# Patient Record
Sex: MALE | Race: WHITE | NOT HISPANIC OR LATINO | URBAN - METROPOLITAN AREA
[De-identification: names, ages, dates, MRNs, and addresses within clinical notes are randomized per-mention and may not be internally consistent; named-entity substitution may affect disease eponyms.]

---

## 2017-01-20 ENCOUNTER — OUTPATIENT (OUTPATIENT)
Dept: OUTPATIENT SERVICES | Facility: HOSPITAL | Age: 72
LOS: 1 days | End: 2017-01-20

## 2017-04-21 ENCOUNTER — OUTPATIENT (OUTPATIENT)
Dept: OUTPATIENT SERVICES | Facility: HOSPITAL | Age: 72
LOS: 1 days | End: 2017-04-21

## 2017-10-25 ENCOUNTER — OUTPATIENT (OUTPATIENT)
Dept: OUTPATIENT SERVICES | Facility: HOSPITAL | Age: 72
LOS: 1 days | End: 2017-10-25

## 2018-03-02 ENCOUNTER — OUTPATIENT (OUTPATIENT)
Dept: OUTPATIENT SERVICES | Facility: HOSPITAL | Age: 73
LOS: 1 days | End: 2018-03-02

## 2018-09-07 ENCOUNTER — OUTPATIENT (OUTPATIENT)
Dept: OUTPATIENT SERVICES | Facility: HOSPITAL | Age: 73
LOS: 1 days | End: 2018-09-07

## 2019-02-04 ENCOUNTER — APPOINTMENT (OUTPATIENT)
Dept: ORTHOPEDIC SURGERY | Facility: CLINIC | Age: 74
End: 2019-02-04
Payer: MEDICARE

## 2019-02-04 VITALS
BODY MASS INDEX: 31.1 KG/M2 | HEIGHT: 69 IN | HEART RATE: 52 BPM | SYSTOLIC BLOOD PRESSURE: 172 MMHG | WEIGHT: 210 LBS | DIASTOLIC BLOOD PRESSURE: 85 MMHG

## 2019-02-04 DIAGNOSIS — M19.019 PRIMARY OSTEOARTHRITIS, UNSPECIFIED SHOULDER: ICD-10-CM

## 2019-02-04 DIAGNOSIS — Z80.0 FAMILY HISTORY OF MALIGNANT NEOPLASM OF DIGESTIVE ORGANS: ICD-10-CM

## 2019-02-04 DIAGNOSIS — M16.11 UNILATERAL PRIMARY OSTEOARTHRITIS, RIGHT HIP: ICD-10-CM

## 2019-02-04 DIAGNOSIS — Z78.9 OTHER SPECIFIED HEALTH STATUS: ICD-10-CM

## 2019-02-04 DIAGNOSIS — M16.10 UNILATERAL PRIMARY OSTEOARTHRITIS, UNSPECIFIED HIP: ICD-10-CM

## 2019-02-04 DIAGNOSIS — M17.10 UNILATERAL PRIMARY OSTEOARTHRITIS, UNSPECIFIED KNEE: ICD-10-CM

## 2019-02-04 PROCEDURE — 73502 X-RAY EXAM HIP UNI 2-3 VIEWS: CPT | Mod: RT

## 2019-02-04 PROCEDURE — 99214 OFFICE O/P EST MOD 30 MIN: CPT

## 2019-02-04 RX ORDER — CHLORHEXIDINE GLUCONATE 4 %
1000 LIQUID (ML) TOPICAL
Refills: 0 | Status: ACTIVE | COMMUNITY

## 2019-02-04 NOTE — HISTORY OF PRESENT ILLNESS
[Pain Location] : pain [Worsening] : worsening [___ mths] : [unfilled] month(s) ago [6] : an average pain level of 6/10 [3] : a minimum pain level of 3/10 [8] : a maximum pain level of 8/10 [de-identified] : 73 year old male presents with right hip pain, which started on 6/1/18. The pain is an ache. The pain fluctuates from a 3-8/10.  Rest and Naproxen alleviates his pain. He exercises 3 times a week with aerobic resistance and core and flexibility exercises. The patient takes Tylenol and Naproxen 325 mg as needed. He notes the pain is limiting his activities. Walking and bending aggravates the pain. He has had no prior treatment of the right hip.\par The patient had a left hip replacement with Dr. Rawls in 2011 at [facility].\par He had a right knee replacement in 2010 at [facility].

## 2019-02-04 NOTE — PHYSICAL EXAM
[ALL] : dorsalis pedis, posterior tibial, femoral, popliteal, and radial 2+ and symmetric bilaterally [Normal] : Gait: normal [LE] : Sensory: Intact in bilateral lower extremities [Poor Appearance] : well-appearing [Acute Distress] : not in acute distress [Obese] : not obese [de-identified] : GENERAL APPEARANCE: Well nourished and hydrated, pleasant, alert, and oriented x 3. Appears their stated age. \par HEENT: Normocephalic, extraocular eye motion intact. Nasal septum midline. Oral cavity clear. External auditory canal clear. \par RESPIRATORY: Breath sounds clear and audible in all lobes. No wheezing, No accessory muscle use.\par CARDIOVASCULAR: No apparent abnormalities. No lower leg edema. No varicosities. Pedal pulses are palpable.\par NEUROLOGIC: Sensation is normal, no muscle weakness in the upper or lower extremities.\par DERMATOLOGIC: No apparent skin lesions, moist, warm, no rash.\par SPINE: Cervical spine appears normal and moves freely; thoracic spine appears normal and moves freely; lumbosacral spine appears normal and moves freely, normal, nontender.\par MUSCULOSKELETAL: Hands, wrists, and elbows are normal and move freely, shoulders are normal and move freely. [de-identified] : Right hip examination demonstrates positive Stinchfield, crepitus with ROM [de-identified] : 3V x-ray of the right hip and pelvis done in office today and reviewed by Dr. Alex Herring demonstrates severe bone on bone right hip osteoarthritis.

## 2019-02-04 NOTE — DISCUSSION/SUMMARY
[de-identified] : 73 year old male presents with severe bone on bone right hip osteoarthritis. We discussed the nature of the condition and the treatment options. Based on the x-ray and physical exam, the patient is a candidate for a right total hip replacement, which the patient will consider scheduling. The patient will follow up after the procedure should he decide to proceed with the surgery.\par \par Total hip arthroplasty: A hip replacement means a resurfacing of both surfaces of the hip, with metal, ceramic and/or plastic parts. The prosthetic parts are usually press fit into bone, and only rarely cemented into position. Patients are allowed to weight bear as tolerated in most cases. The postoperative motion is determined by multiple factors the most important of which is preoperative motion. In general, the better the motion pre operatively, the better the motion post operatively. The operation, pending medical clearance, generally requires a hospitalization of 3-4 days. In general, we prefer to perform the procedure under epidural or general anesthesia. Preoperatively we institute a nomogram for blood management which may include the administration of procrit. The operative procedure takes approximately 1-2 hours. The operation requires an oblique incision anywhere from 4-6 inches over the posterolateral hip region. Post operatively the patient is walking the day of or the day after surgery. The first couple of days are very painful and the pain medication will alleviate but not eliminate the pain. Thus the patient must really push hard to get their mobility back. Our goal for having the person go home is that they can walk with a walker or a cane. The walking aide is to be dispensed with once the patient is secure enough. In general, there is no cast or braces required with a routine hip replacement. In the long term, we do not encourage our patients to run for the sake of running; although, pending their pre operative status, we often allow patients to play doubles tennis or comparable activities. We also allow them to do gentle intermediate downhill skiing if they are truly an expert skier. Biking is encouraged as well as swimming. The follow up periods are usually at 3 weeks, 6 weeks, 3 months, and yearly intervals. Potential complications with total hip replacements include anaesthetic complications and death, infection (less than 1%), nerve damage, and in the case of a sciatic nerve injury, a permanent foot drop, (a flapping foot with ambulation that requires bracing). There are always areas of skin numbness but this is not an untoward effect nor do we consider it a complication. Other potential complications include dislocation of the hip component (less than 5%). In cases of recurrent dislocation revision surgery may be required. The loosening of either the acetabular or femoral component is much more infrequent. The components may wear, creating particulate debris, loosening and systemic complications. Specific concerns exist with all bearing surfaces such as metal sensitivity with metal on metal components, and the risk of fracture and squeaking with ceramic components. Major blood vessel damage is also extremely rare. Theoretically because of the anatomic proximity of the femoral artery, it could be lacerated with subsequent repairs required. Albeit unlikely, a disruption of the femoral artery could theoretically result in an amputation. Similarly, infection could theoretically result in an amputation if one were to grow out an organism that cannot be controlled with antibiotics. Most infections require removal of the prosthesis, placement of an antibiotic spacer, IV antibiotics for eradication, prior to reimplantation. General medical complications include phlebitis for which we prophylactically anticoagulate but it could still occur and fatal pulmonary embolus has been reported. Cardiovascular problems, such as a heart attack or ischemia are always a concern with such hemodynamic changes in the blood vascular system. There is a risk of leg length discrepancy and the need for a shoe lift. Other general complications are very rare but anything in medicine could theoretically happen. I think the patient understands the risk benefit ratio of total hip replacement and will think about whether they would like to pursue an operative or non-operative option.

## 2019-02-04 NOTE — REVIEW OF SYSTEMS
[Joint Pain] : joint pain [Negative] : Heme/Lymph [Joint Stiffness] : joint stiffness [FreeTextEntry9] : right hip

## 2019-02-04 NOTE — ADDENDUM
[FreeTextEntry1] : I, Smita Weiss, acted solely as a scribe for Dr. Alex Herring on this date 02/04/2019.

## 2019-03-15 ENCOUNTER — OUTPATIENT (OUTPATIENT)
Dept: OUTPATIENT SERVICES | Facility: HOSPITAL | Age: 74
LOS: 1 days | End: 2019-03-15

## 2019-05-10 ENCOUNTER — NON-APPOINTMENT (OUTPATIENT)
Age: 74
End: 2019-05-10

## 2019-05-10 ENCOUNTER — APPOINTMENT (OUTPATIENT)
Dept: CARDIOLOGY | Facility: CLINIC | Age: 74
End: 2019-05-10
Payer: MEDICARE

## 2019-05-10 VITALS
DIASTOLIC BLOOD PRESSURE: 80 MMHG | HEIGHT: 69 IN | SYSTOLIC BLOOD PRESSURE: 120 MMHG | BODY MASS INDEX: 28.73 KG/M2 | HEART RATE: 62 BPM | OXYGEN SATURATION: 96 % | WEIGHT: 194 LBS

## 2019-05-10 DIAGNOSIS — Z01.810 ENCOUNTER FOR PREPROCEDURAL CARDIOVASCULAR EXAMINATION: ICD-10-CM

## 2019-05-10 DIAGNOSIS — Z82.49 FAMILY HISTORY OF ISCHEMIC HEART DISEASE AND OTHER DISEASES OF THE CIRCULATORY SYSTEM: ICD-10-CM

## 2019-05-10 PROCEDURE — 93000 ELECTROCARDIOGRAM COMPLETE: CPT

## 2019-05-10 PROCEDURE — 99204 OFFICE O/P NEW MOD 45 MIN: CPT

## 2019-05-10 RX ORDER — MULTIVITAMIN
TABLET ORAL DAILY
Refills: 0 | Status: ACTIVE | COMMUNITY

## 2019-05-10 RX ORDER — BISOPROLOL FUMARATE AND HYDROCHLOROTHIAZIDE 10; 6.25 MG/1; MG/1
10-6.25 TABLET, FILM COATED ORAL DAILY
Refills: 0 | Status: ACTIVE | COMMUNITY

## 2019-05-10 RX ORDER — NAPROXEN 375 MG/1
375 TABLET ORAL
Refills: 0 | Status: DISCONTINUED | COMMUNITY
End: 2019-05-10

## 2019-05-10 NOTE — REVIEW OF SYSTEMS
[see HPI] : see HPI [Joint Stiffness] : joint stiffness [Joint Pain] : joint pain [Negative] : Heme/Lymph

## 2019-05-13 ENCOUNTER — OUTPATIENT (OUTPATIENT)
Dept: OUTPATIENT SERVICES | Facility: HOSPITAL | Age: 74
LOS: 1 days | End: 2019-05-13
Payer: MEDICARE

## 2019-05-13 VITALS
HEART RATE: 56 BPM | HEIGHT: 69 IN | DIASTOLIC BLOOD PRESSURE: 80 MMHG | TEMPERATURE: 97 F | SYSTOLIC BLOOD PRESSURE: 165 MMHG | RESPIRATION RATE: 16 BRPM | WEIGHT: 201.5 LBS

## 2019-05-13 DIAGNOSIS — Z29.9 ENCOUNTER FOR PROPHYLACTIC MEASURES, UNSPECIFIED: ICD-10-CM

## 2019-05-13 DIAGNOSIS — Z01.818 ENCOUNTER FOR OTHER PREPROCEDURAL EXAMINATION: ICD-10-CM

## 2019-05-13 DIAGNOSIS — I10 ESSENTIAL (PRIMARY) HYPERTENSION: ICD-10-CM

## 2019-05-13 DIAGNOSIS — Z98.890 OTHER SPECIFIED POSTPROCEDURAL STATES: Chronic | ICD-10-CM

## 2019-05-13 DIAGNOSIS — E78.00 PURE HYPERCHOLESTEROLEMIA, UNSPECIFIED: ICD-10-CM

## 2019-05-13 DIAGNOSIS — M16.11 UNILATERAL PRIMARY OSTEOARTHRITIS, RIGHT HIP: ICD-10-CM

## 2019-05-13 DIAGNOSIS — Z96.651 PRESENCE OF RIGHT ARTIFICIAL KNEE JOINT: Chronic | ICD-10-CM

## 2019-05-13 DIAGNOSIS — E03.9 HYPOTHYROIDISM, UNSPECIFIED: ICD-10-CM

## 2019-05-13 DIAGNOSIS — Z96.649 PRESENCE OF UNSPECIFIED ARTIFICIAL HIP JOINT: Chronic | ICD-10-CM

## 2019-05-13 LAB
ALBUMIN SERPL ELPH-MCNC: 4.4 G/DL — SIGNIFICANT CHANGE UP (ref 3.3–5.2)
ALP SERPL-CCNC: 64 U/L — SIGNIFICANT CHANGE UP (ref 40–120)
ALT FLD-CCNC: 15 U/L — SIGNIFICANT CHANGE UP
ANION GAP SERPL CALC-SCNC: 12 MMOL/L — SIGNIFICANT CHANGE UP (ref 5–17)
APTT BLD: 36 SEC — SIGNIFICANT CHANGE UP (ref 27.5–36.3)
AST SERPL-CCNC: 25 U/L — SIGNIFICANT CHANGE UP
BASOPHILS # BLD AUTO: 0 K/UL — SIGNIFICANT CHANGE UP (ref 0–0.2)
BASOPHILS NFR BLD AUTO: 0.1 % — SIGNIFICANT CHANGE UP (ref 0–2)
BILIRUB SERPL-MCNC: 0.6 MG/DL — SIGNIFICANT CHANGE UP (ref 0.4–2)
BLD GP AB SCN SERPL QL: SIGNIFICANT CHANGE UP
BUN SERPL-MCNC: 23 MG/DL — HIGH (ref 8–20)
CALCIUM SERPL-MCNC: 10 MG/DL — SIGNIFICANT CHANGE UP (ref 8.6–10.2)
CHLORIDE SERPL-SCNC: 102 MMOL/L — SIGNIFICANT CHANGE UP (ref 98–107)
CO2 SERPL-SCNC: 27 MMOL/L — SIGNIFICANT CHANGE UP (ref 22–29)
CREAT SERPL-MCNC: 0.82 MG/DL — SIGNIFICANT CHANGE UP (ref 0.5–1.3)
EOSINOPHIL # BLD AUTO: 0.2 K/UL — SIGNIFICANT CHANGE UP (ref 0–0.5)
EOSINOPHIL NFR BLD AUTO: 2.7 % — SIGNIFICANT CHANGE UP (ref 0–6)
GLUCOSE SERPL-MCNC: 109 MG/DL — SIGNIFICANT CHANGE UP (ref 70–115)
HBA1C BLD-MCNC: 5.5 % — SIGNIFICANT CHANGE UP (ref 4–5.6)
HCT VFR BLD CALC: 43.9 % — SIGNIFICANT CHANGE UP (ref 42–52)
HGB BLD-MCNC: 14.7 G/DL — SIGNIFICANT CHANGE UP (ref 14–18)
INR BLD: 1.06 RATIO — SIGNIFICANT CHANGE UP (ref 0.88–1.16)
LYMPHOCYTES # BLD AUTO: 1.4 K/UL — SIGNIFICANT CHANGE UP (ref 1–4.8)
LYMPHOCYTES # BLD AUTO: 19.3 % — LOW (ref 20–55)
MCHC RBC-ENTMCNC: 31.7 PG — HIGH (ref 27–31)
MCHC RBC-ENTMCNC: 33.5 G/DL — SIGNIFICANT CHANGE UP (ref 32–36)
MCV RBC AUTO: 94.6 FL — HIGH (ref 80–94)
MONOCYTES # BLD AUTO: 0.6 K/UL — SIGNIFICANT CHANGE UP (ref 0–0.8)
MONOCYTES NFR BLD AUTO: 9.1 % — SIGNIFICANT CHANGE UP (ref 3–10)
MRSA PCR RESULT.: SIGNIFICANT CHANGE UP
NEUTROPHILS # BLD AUTO: 4.8 K/UL — SIGNIFICANT CHANGE UP (ref 1.8–8)
NEUTROPHILS NFR BLD AUTO: 68.7 % — SIGNIFICANT CHANGE UP (ref 37–73)
PLATELET # BLD AUTO: 143 K/UL — LOW (ref 150–400)
POTASSIUM SERPL-MCNC: 4.6 MMOL/L — SIGNIFICANT CHANGE UP (ref 3.5–5.3)
POTASSIUM SERPL-SCNC: 4.6 MMOL/L — SIGNIFICANT CHANGE UP (ref 3.5–5.3)
PROT SERPL-MCNC: 7.8 G/DL — SIGNIFICANT CHANGE UP (ref 6.6–8.7)
PROTHROM AB SERPL-ACNC: 12.2 SEC — SIGNIFICANT CHANGE UP (ref 10–12.9)
RBC # BLD: 4.64 M/UL — SIGNIFICANT CHANGE UP (ref 4.6–6.2)
RBC # FLD: 14 % — SIGNIFICANT CHANGE UP (ref 11–15.6)
S AUREUS DNA NOSE QL NAA+PROBE: SIGNIFICANT CHANGE UP
SODIUM SERPL-SCNC: 141 MMOL/L — SIGNIFICANT CHANGE UP (ref 135–145)
TYPE + AB SCN PNL BLD: SIGNIFICANT CHANGE UP
WBC # BLD: 7 K/UL — SIGNIFICANT CHANGE UP (ref 4.8–10.8)
WBC # FLD AUTO: 7 K/UL — SIGNIFICANT CHANGE UP (ref 4.8–10.8)

## 2019-05-13 PROCEDURE — 87640 STAPH A DNA AMP PROBE: CPT

## 2019-05-13 PROCEDURE — 85610 PROTHROMBIN TIME: CPT

## 2019-05-13 PROCEDURE — 86900 BLOOD TYPING SEROLOGIC ABO: CPT

## 2019-05-13 PROCEDURE — 86850 RBC ANTIBODY SCREEN: CPT

## 2019-05-13 PROCEDURE — 85730 THROMBOPLASTIN TIME PARTIAL: CPT

## 2019-05-13 PROCEDURE — 87641 MR-STAPH DNA AMP PROBE: CPT

## 2019-05-13 PROCEDURE — 83036 HEMOGLOBIN GLYCOSYLATED A1C: CPT

## 2019-05-13 PROCEDURE — 85027 COMPLETE CBC AUTOMATED: CPT

## 2019-05-13 PROCEDURE — 86901 BLOOD TYPING SEROLOGIC RH(D): CPT

## 2019-05-13 PROCEDURE — 80053 COMPREHEN METABOLIC PANEL: CPT

## 2019-05-13 PROCEDURE — 36415 COLL VENOUS BLD VENIPUNCTURE: CPT

## 2019-05-13 PROCEDURE — G0463: CPT

## 2019-05-13 RX ORDER — SODIUM CHLORIDE 9 MG/ML
3 INJECTION INTRAMUSCULAR; INTRAVENOUS; SUBCUTANEOUS EVERY 8 HOURS
Refills: 0 | Status: DISCONTINUED | OUTPATIENT
Start: 2019-05-30 | End: 2019-05-31

## 2019-05-13 NOTE — H&P PST ADULT - HISTORY OF PRESENT ILLNESS
73 year old male with right hip pain 73 year old male with right hip pain for the last one year which progressively got worse now scheduled for right hip total replacement.

## 2019-05-13 NOTE — H&P PST ADULT - ASSESSMENT
medications reviewed, instructions given on what medications to take and what not to take. Asked the patient to take the Blood pressure medication/ heart medication on DOP.   CAPRINI VTE 2.0 SCORE [CLOT updated 2019]    AGE RELATED RISK FACTORS                                                       MOBILITY RELATED FACTORS  [ ] Age 41-60 years                                            (1 Point)                    [ ] Bed rest                                                        (1 Point)  [x ] Age: 61-74 years                                           (2 Points)                  [ ] Plaster cast                                                   (2 Points)  [ ] Age= 75 years                                              (3 Points)                    [ ] Bed bound for more than 72 hours                 (2 Points)    DISEASE RELATED RISK FACTORS                                               GENDER SPECIFIC FACTORS  [ ] Edema in the lower extremities                       (1 Point)              [ ] Pregnancy                                                     (1 Point)  [ ] Varicose veins                                               (1 Point)                     [ ] Post-partum < 6 weeks                                   (1 Point)             [x ] BMI > 25 Kg/m2                                            (1 Point)                     [ ] Hormonal therapy  or oral contraception          (1 Point)                 [ ] Sepsis (in the previous month)                        (1 Point)               [ ] History of pregnancy complications                 (1 point)  [ ] Pneumonia or serious lung disease                                               [ ] Unexplained or recurrent                     (1 Point)           (in the previous month)                               (1 Point)  [ ] Abnormal pulmonary function test                     (1 Point)                 SURGERY RELATED RISK FACTORS  [ ] Acute myocardial infarction                              (1 Point)               [ ]  Section                                             (1 Point)  [ ] Congestive heart failure (in the previous month)  (1 Point)      [ ] Minor surgery                                                  (1 Point)   [ ] Inflammatory bowel disease                             (1 Point)               [ ] Arthroscopic surgery                                        (2 Points)  [ ] Central venous access                                      (2 Points)                [ ] General surgery lasting more than 45 minutes (2 points)  [ ] Malignancy- Present or previous                   (2 Points)                [ x] Elective arthroplasty                                         (5 points)    [ ] Stroke (in the previous month)                          (5 Points)                                                                                                                                                           HEMATOLOGY RELATED FACTORS                                                 TRAUMA RELATED RISK FACTORS  [ ] Prior episodes of VTE                                     (3 Points)                [ ] Fracture of the hip, pelvis, or leg                       (5 Points)  [ ] Positive family history for VTE                         (3 Points)             [ ] Acute spinal cord injury (in the previous month)  (5 Points)  [ ] Prothrombin 98780 A                                     (3 Points)               [ ] Paralysis  (less than 1 month)                             (5 Points)  [ ] Factor V Leiden                                             (3 Points)                  [ ] Multiple Trauma within 1 month                        (5 Points)  [ ] Lupus anticoagulants                                     (3 Points)                                                           [ ] Anticardiolipin antibodies                               (3 Points)                                                       [ ] High homocysteine in the blood                      (3 Points)                                             [ ] Other congenital or acquired thrombophilia      (3 Points)                                                [ ] Heparin induced thrombocytopenia                  (3 Points)                                     Total Score [     8     ]  OPIOID RISK TOOL    JULISA EACH BOX THAT APPLIES AND ADD TOTALS AT THE END    FAMILY HISTORY OF SUBSTANCE ABUSE                 FEMALE         MALE                                                Alcohol                             [  ]1 pt          [  ]3pts                                               Illegal Drugs                     [  ]2 pts        [  ]3pts                                               Rx Drugs                           [  ]4 pts        [  ]4 pts    PERSONAL HISTORY OF SUBSTANCE ABUSE                                                                                          Alcohol                             [  ]3 pts       [  ]3 pts                                               Illegal Drugs                     [  ]4 pts        [  ]4 pts                                               Rx Drugs                           [  ]5 pts        [  ]5 pts    AGE BETWEEN 16-45 YEARS                                      [  ]1 pt         [  ]1 pt    HISTORY OF PREADOLESCENT   SEXUAL ABUSE                                                             [  ]3 pts        [  ]0pts    PSYCHOLOGICAL DISEASE                     ADD, OCD, Bipolar, Schizophrenia        [  ]2 pts         [  ]2 pts                      Depression                                               [  ]1 pt           [  ]1 pt           SCORING TOTAL   (add numbers and type here)              ( 0)                                     A score of 3 or lower indicated LOW risk for future opioid abuse  A score of 4 to 7 indicated moderate risk for future opioid abuse  A score of 8 or higher indicates a high risk for opioid abuse

## 2019-05-13 NOTE — H&P PST ADULT - NSICDXPASTMEDICALHX_GEN_ALL_CORE_FT
PAST MEDICAL HISTORY:  Hypertension     Hypothyroidism PAST MEDICAL HISTORY:  High cholesterol     Hypertension     Hypothyroidism

## 2019-05-13 NOTE — H&P PST ADULT - NSANTHOSAYNRD_GEN_A_CORE
No. BETI screening performed.  STOP BANG Legend: 0-2 = LOW Risk; 3-4 = INTERMEDIATE Risk; 5-8 = HIGH Risk

## 2019-05-13 NOTE — H&P PST ADULT - NSICDXPASTSURGICALHX_GEN_ALL_CORE_FT
PAST SURGICAL HISTORY:  H/O total knee replacement, right 2010    History of hip replacement left 2011    S/P bilateral inguinal hernia repair

## 2019-05-13 NOTE — H&P PST ADULT - NSICDXPROBLEM_GEN_ALL_CORE_FT
PROBLEM DIAGNOSES  Problem: Need for prophylactic measure  Assessment and Plan: Caprini Score 8 High risk,  Surgical team should assess /Strongly recommend pharmacological and mechanical measures for VTE prophylaxis     Problem: High cholesterol  Assessment and Plan: continue medications as instructed.     Problem: Hypothyroidism  Assessment and Plan: continue medications as instructed.     Problem: Hypertension  Assessment and Plan: continue medications as instructed. Asked the patient to take the Blood pressure medication/ heart medication on DOP.     Problem: Osteoarthritis of right hip  Assessment and Plan: Right total hip replacement. Medical and cardiac clearance pending

## 2019-05-13 NOTE — PATIENT PROFILE ADULT - NSPROEDALEARNPREF_GEN_A_NUR
verbal instruction/written material/Pre op teaching surgical scrub pain management instructions given to pt/individual instruction

## 2019-05-17 ENCOUNTER — APPOINTMENT (OUTPATIENT)
Dept: CARDIOLOGY | Facility: CLINIC | Age: 74
End: 2019-05-17
Payer: MEDICARE

## 2019-05-17 PROCEDURE — 93306 TTE W/DOPPLER COMPLETE: CPT

## 2019-05-22 ENCOUNTER — APPOINTMENT (OUTPATIENT)
Dept: CARDIOLOGY | Facility: CLINIC | Age: 74
End: 2019-05-22
Payer: MEDICARE

## 2019-05-22 PROCEDURE — 93015 CV STRESS TEST SUPVJ I&R: CPT

## 2019-05-22 PROCEDURE — A9502: CPT

## 2019-05-22 PROCEDURE — 78452 HT MUSCLE IMAGE SPECT MULT: CPT

## 2019-05-23 ENCOUNTER — APPOINTMENT (OUTPATIENT)
Dept: CARDIOLOGY | Facility: CLINIC | Age: 74
End: 2019-05-23

## 2019-05-23 NOTE — ADDENDUM
[FreeTextEntry1] : Nuclear stress test no ischemia\par Echocardiogram May 2019. Normal left ventricular function, ascending aorta, 4.2, mild to moderate aortic regurgitation\par \par Preoperative status [hip] \par 05/23/2019 \par At present, there are no active cardiac conditions. \par No recent unstable coronary syndromes, decompensated heart failure, severe valvular heart disease or significant dysrhythmias.  \par The clinical benefit of the proposed procedure outweighs the associated cardiovascular risk.  \par Risk not attenuated with further CV testing.  \par Optimized from a cardiovascular perspective.\par Continue periop beta blocker\par

## 2019-05-23 NOTE — PHYSICAL EXAM
[General Appearance - Well Developed] : well developed [Well Groomed] : well groomed [No Deformities] : no deformities [General Appearance - Well Nourished] : well nourished [Normal Appearance] : normal appearance [General Appearance - In No Acute Distress] : no acute distress [Normal Conjunctiva] : the conjunctiva exhibited no abnormalities [Normal Oral Mucosa] : normal oral mucosa [No Oral Pallor] : no oral pallor [Eyelids - No Xanthelasma] : the eyelids demonstrated no xanthelasmas [Normal Jugular Venous V Waves Present] : normal jugular venous V waves present [No Oral Cyanosis] : no oral cyanosis [Normal Jugular Venous A Waves Present] : normal jugular venous A waves present [No Jugular Venous Deal A Waves] : no jugular venous deal A waves [Heart Rate And Rhythm] : heart rate and rhythm were normal [Heart Sounds] : normal S1 and S2 [Murmurs] : no murmurs present [Respiration, Rhythm And Depth] : normal respiratory rhythm and effort [Exaggerated Use Of Accessory Muscles For Inspiration] : no accessory muscle use [Auscultation Breath Sounds / Voice Sounds] : lungs were clear to auscultation bilaterally [Abdomen Soft] : soft [Abdomen Tenderness] : non-tender [Abdomen Mass (___ Cm)] : no abdominal mass palpated [Petechial Hemorrhages (___cm)] : no petechial hemorrhages [Cyanosis, Localized] : no localized cyanosis [Nail Clubbing] : no clubbing of the fingernails [Skin Color & Pigmentation] : normal skin color and pigmentation [] : no rash [No Venous Stasis] : no venous stasis [Skin Lesions] : no skin lesions [No Skin Ulcers] : no skin ulcer [No Xanthoma] : no  xanthoma was observed [Oriented To Time, Place, And Person] : oriented to person, place, and time [Affect] : the affect was normal [Mood] : the mood was normal [No Anxiety] : not feeling anxious [FreeTextEntry1] : difficulty ambulating

## 2019-05-23 NOTE — ASSESSMENT
[FreeTextEntry1] : Recent blood work and EKG have been reviewed. \par Limited functional status. \par Intermediate risk surgery. \par Nuclear stress test will be performed to evaluate myocardial perfusion\par Echocardiogram to evaluate resting cardiac structure and function. \par Further perioperative recommendations after completion of above testing

## 2019-05-23 NOTE — HISTORY OF PRESENT ILLNESS
[FreeTextEntry1] : ZENOBIA KEENE  is a 73 year old  M\par There is a history of arthritis, hypothyroidism, long-standing hypertension, and hyperlipidemia.\par Now severe bone-on-bone arthritis\par Plan for right hip replacement later this month. \par \par Previously underwent outside cardiovascular evaluation prior to orthopedic surgery.\par There is no prior history of a clinical myocardial infarction, coronary revascularization. \par There is no history of symptomatic congestive heart failure rheumatic heart disease or valvular disease.\par There is no history of symptomatic arrhythmias including atrial fibrillation.\par \par Functional status is limited due to the arthritis. \par He is unable to walk on the treadmill. \par Previously physically active and worked as a , engaged in contact sports. \par There is no exertional chest pain, pressure or discomfort. \par There is no significant dyspnea on exertion or orthopnea. \par There are no symptomatic palpitations, lightheadedness, dizziness or syncope.\par \par EKG demonstrates sinus bradycardia with possible LVH and left anterior fascicular block.\par Blood work, March 2019, creatinine 0.8, total cholesterol 167, LDL 77, hemoglobin 14.8\par

## 2019-05-28 PROBLEM — E78.00 PURE HYPERCHOLESTEROLEMIA, UNSPECIFIED: Chronic | Status: ACTIVE | Noted: 2019-05-13

## 2019-05-28 PROBLEM — E03.9 HYPOTHYROIDISM, UNSPECIFIED: Chronic | Status: ACTIVE | Noted: 2019-05-13

## 2019-05-28 PROBLEM — I10 ESSENTIAL (PRIMARY) HYPERTENSION: Chronic | Status: ACTIVE | Noted: 2019-05-13

## 2019-05-29 ENCOUNTER — TRANSCRIPTION ENCOUNTER (OUTPATIENT)
Age: 74
End: 2019-05-29

## 2019-05-29 RX ORDER — TRANEXAMIC ACID 100 MG/ML
900 INJECTION, SOLUTION INTRAVENOUS ONCE
Refills: 0 | Status: DISCONTINUED | OUTPATIENT
Start: 2019-05-30 | End: 2019-05-30

## 2019-05-29 RX ORDER — CEFAZOLIN SODIUM 1 G
2000 VIAL (EA) INJECTION ONCE
Refills: 0 | Status: COMPLETED | OUTPATIENT
Start: 2019-05-30 | End: 2019-05-30

## 2019-05-30 ENCOUNTER — APPOINTMENT (OUTPATIENT)
Dept: ORTHOPEDIC SURGERY | Facility: HOSPITAL | Age: 74
End: 2019-05-30

## 2019-05-30 ENCOUNTER — INPATIENT (INPATIENT)
Facility: HOSPITAL | Age: 74
LOS: 0 days | Discharge: HOME | DRG: 470 | End: 2019-05-31
Attending: ORTHOPAEDIC SURGERY | Admitting: ORTHOPAEDIC SURGERY
Payer: MEDICARE

## 2019-05-30 ENCOUNTER — TRANSCRIPTION ENCOUNTER (OUTPATIENT)
Age: 74
End: 2019-05-30

## 2019-05-30 VITALS
DIASTOLIC BLOOD PRESSURE: 71 MMHG | WEIGHT: 190.92 LBS | SYSTOLIC BLOOD PRESSURE: 134 MMHG | TEMPERATURE: 98 F | OXYGEN SATURATION: 96 % | HEART RATE: 57 BPM | HEIGHT: 69 IN | RESPIRATION RATE: 13 BRPM

## 2019-05-30 DIAGNOSIS — M16.11 UNILATERAL PRIMARY OSTEOARTHRITIS, RIGHT HIP: ICD-10-CM

## 2019-05-30 DIAGNOSIS — Z96.651 PRESENCE OF RIGHT ARTIFICIAL KNEE JOINT: Chronic | ICD-10-CM

## 2019-05-30 DIAGNOSIS — Z96.649 PRESENCE OF UNSPECIFIED ARTIFICIAL HIP JOINT: Chronic | ICD-10-CM

## 2019-05-30 DIAGNOSIS — Z98.890 OTHER SPECIFIED POSTPROCEDURAL STATES: Chronic | ICD-10-CM

## 2019-05-30 LAB
GLUCOSE BLDC GLUCOMTR-MCNC: 110 MG/DL — HIGH (ref 70–99)
GLUCOSE BLDC GLUCOMTR-MCNC: 114 MG/DL — HIGH (ref 70–99)
GLUCOSE BLDC GLUCOMTR-MCNC: 128 MG/DL — HIGH (ref 70–99)

## 2019-05-30 PROCEDURE — 27130 TOTAL HIP ARTHROPLASTY: CPT | Mod: AS,RT

## 2019-05-30 PROCEDURE — 27130 TOTAL HIP ARTHROPLASTY: CPT | Mod: RT

## 2019-05-30 PROCEDURE — 73501 X-RAY EXAM HIP UNI 1 VIEW: CPT | Mod: 26,RT

## 2019-05-30 RX ORDER — VANCOMYCIN HCL 1 G
1500 VIAL (EA) INTRAVENOUS
Refills: 0 | Status: COMPLETED | OUTPATIENT
Start: 2019-05-30 | End: 2019-05-31

## 2019-05-30 RX ORDER — HYDROMORPHONE HYDROCHLORIDE 2 MG/ML
0.5 INJECTION INTRAMUSCULAR; INTRAVENOUS; SUBCUTANEOUS EVERY 4 HOURS
Refills: 0 | Status: DISCONTINUED | OUTPATIENT
Start: 2019-05-30 | End: 2019-05-31

## 2019-05-30 RX ORDER — ACETAMINOPHEN 500 MG
975 TABLET ORAL EVERY 8 HOURS
Refills: 0 | Status: DISCONTINUED | OUTPATIENT
Start: 2019-05-30 | End: 2019-05-31

## 2019-05-30 RX ORDER — CEFAZOLIN SODIUM 1 G
2000 VIAL (EA) INJECTION
Refills: 0 | Status: COMPLETED | OUTPATIENT
Start: 2019-05-30 | End: 2019-05-31

## 2019-05-30 RX ORDER — GABAPENTIN 400 MG/1
600 CAPSULE ORAL ONCE
Refills: 0 | Status: COMPLETED | OUTPATIENT
Start: 2019-05-30 | End: 2019-05-30

## 2019-05-30 RX ORDER — OXYCODONE HYDROCHLORIDE 5 MG/1
10 TABLET ORAL EVERY 12 HOURS
Refills: 0 | Status: DISCONTINUED | OUTPATIENT
Start: 2019-05-30 | End: 2019-05-31

## 2019-05-30 RX ORDER — ASPIRIN/CALCIUM CARB/MAGNESIUM 324 MG
325 TABLET ORAL
Refills: 0 | Status: DISCONTINUED | OUTPATIENT
Start: 2019-05-31 | End: 2019-05-31

## 2019-05-30 RX ORDER — VANCOMYCIN HCL 1 G
1500 VIAL (EA) INTRAVENOUS ONCE
Refills: 0 | Status: COMPLETED | OUTPATIENT
Start: 2019-05-30 | End: 2019-05-30

## 2019-05-30 RX ORDER — OXYCODONE HYDROCHLORIDE 5 MG/1
10 TABLET ORAL EVERY 4 HOURS
Refills: 0 | Status: DISCONTINUED | OUTPATIENT
Start: 2019-05-30 | End: 2019-05-31

## 2019-05-30 RX ORDER — LEVOTHYROXINE SODIUM 125 MCG
100 TABLET ORAL DAILY
Refills: 0 | Status: DISCONTINUED | OUTPATIENT
Start: 2019-05-30 | End: 2019-05-31

## 2019-05-30 RX ORDER — ATORVASTATIN CALCIUM 80 MG/1
1 TABLET, FILM COATED ORAL
Qty: 0 | Refills: 0 | DISCHARGE

## 2019-05-30 RX ORDER — METOPROLOL TARTRATE 50 MG
100 TABLET ORAL
Refills: 0 | Status: DISCONTINUED | OUTPATIENT
Start: 2019-05-31 | End: 2019-05-31

## 2019-05-30 RX ORDER — OXYCODONE HYDROCHLORIDE 5 MG/1
5 TABLET ORAL EVERY 4 HOURS
Refills: 0 | Status: DISCONTINUED | OUTPATIENT
Start: 2019-05-30 | End: 2019-05-31

## 2019-05-30 RX ORDER — CELECOXIB 200 MG/1
400 CAPSULE ORAL ONCE
Refills: 0 | Status: COMPLETED | OUTPATIENT
Start: 2019-05-30 | End: 2019-05-30

## 2019-05-30 RX ORDER — SODIUM CHLORIDE 9 MG/ML
1000 INJECTION, SOLUTION INTRAVENOUS
Refills: 0 | Status: DISCONTINUED | OUTPATIENT
Start: 2019-05-30 | End: 2019-05-31

## 2019-05-30 RX ORDER — CHOLECALCIFEROL (VITAMIN D3) 125 MCG
1 CAPSULE ORAL
Qty: 0 | Refills: 0 | DISCHARGE

## 2019-05-30 RX ORDER — MAGNESIUM HYDROXIDE 400 MG/1
30 TABLET, CHEWABLE ORAL DAILY
Refills: 0 | Status: DISCONTINUED | OUTPATIENT
Start: 2019-05-30 | End: 2019-05-31

## 2019-05-30 RX ORDER — OXYCODONE HYDROCHLORIDE 5 MG/1
10 TABLET ORAL ONCE
Refills: 0 | Status: DISCONTINUED | OUTPATIENT
Start: 2019-05-30 | End: 2019-05-30

## 2019-05-30 RX ORDER — FERROUS SULFATE 325(65) MG
325 TABLET ORAL DAILY
Refills: 0 | Status: DISCONTINUED | OUTPATIENT
Start: 2019-05-30 | End: 2019-05-31

## 2019-05-30 RX ORDER — BISOPROLOL FUMARATE AND HYDROCHLOROTHIAZIDE 5; 6.25 MG/1; MG/1
1 TABLET ORAL
Qty: 0 | Refills: 0 | DISCHARGE

## 2019-05-30 RX ORDER — FENTANYL CITRATE 50 UG/ML
25 INJECTION INTRAVENOUS
Refills: 0 | Status: DISCONTINUED | OUTPATIENT
Start: 2019-05-30 | End: 2019-05-31

## 2019-05-30 RX ORDER — SENNA PLUS 8.6 MG/1
2 TABLET ORAL AT BEDTIME
Refills: 0 | Status: DISCONTINUED | OUTPATIENT
Start: 2019-05-30 | End: 2019-05-31

## 2019-05-30 RX ORDER — ONDANSETRON 8 MG/1
4 TABLET, FILM COATED ORAL ONCE
Refills: 0 | Status: DISCONTINUED | OUTPATIENT
Start: 2019-05-30 | End: 2019-05-31

## 2019-05-30 RX ORDER — KETOROLAC TROMETHAMINE 30 MG/ML
15 SYRINGE (ML) INJECTION EVERY 6 HOURS
Refills: 0 | Status: DISCONTINUED | OUTPATIENT
Start: 2019-05-30 | End: 2019-05-31

## 2019-05-30 RX ORDER — DOCUSATE SODIUM 100 MG
100 CAPSULE ORAL THREE TIMES A DAY
Refills: 0 | Status: DISCONTINUED | OUTPATIENT
Start: 2019-05-30 | End: 2019-05-31

## 2019-05-30 RX ORDER — ACETAMINOPHEN 500 MG
975 TABLET ORAL ONCE
Refills: 0 | Status: COMPLETED | OUTPATIENT
Start: 2019-05-30 | End: 2019-05-30

## 2019-05-30 RX ORDER — PREGABALIN 225 MG/1
1 CAPSULE ORAL
Qty: 0 | Refills: 0 | DISCHARGE

## 2019-05-30 RX ORDER — ATORVASTATIN CALCIUM 80 MG/1
10 TABLET, FILM COATED ORAL AT BEDTIME
Refills: 0 | Status: DISCONTINUED | OUTPATIENT
Start: 2019-05-30 | End: 2019-05-31

## 2019-05-30 RX ORDER — ACETAMINOPHEN 500 MG
650 TABLET ORAL EVERY 6 HOURS
Refills: 0 | Status: DISCONTINUED | OUTPATIENT
Start: 2019-05-30 | End: 2019-05-31

## 2019-05-30 RX ORDER — LEVOTHYROXINE SODIUM 125 MCG
1 TABLET ORAL
Qty: 0 | Refills: 0 | DISCHARGE

## 2019-05-30 RX ORDER — ONDANSETRON 8 MG/1
4 TABLET, FILM COATED ORAL EVERY 6 HOURS
Refills: 0 | Status: DISCONTINUED | OUTPATIENT
Start: 2019-05-30 | End: 2019-05-31

## 2019-05-30 RX ADMIN — GABAPENTIN 600 MILLIGRAM(S): 400 CAPSULE ORAL at 12:19

## 2019-05-30 RX ADMIN — Medication 100 MILLIGRAM(S): at 23:10

## 2019-05-30 RX ADMIN — Medication 15 MILLIGRAM(S): at 23:10

## 2019-05-30 RX ADMIN — Medication 100 MILLIGRAM(S): at 23:04

## 2019-05-30 RX ADMIN — Medication 975 MILLIGRAM(S): at 23:06

## 2019-05-30 RX ADMIN — CELECOXIB 400 MILLIGRAM(S): 200 CAPSULE ORAL at 12:18

## 2019-05-30 RX ADMIN — Medication 15 MILLIGRAM(S): at 23:25

## 2019-05-30 RX ADMIN — SENNA PLUS 2 TABLET(S): 8.6 TABLET ORAL at 23:10

## 2019-05-30 RX ADMIN — SODIUM CHLORIDE 3 MILLILITER(S): 9 INJECTION INTRAMUSCULAR; INTRAVENOUS; SUBCUTANEOUS at 21:36

## 2019-05-30 RX ADMIN — Medication 100 MILLIGRAM(S): at 15:05

## 2019-05-30 RX ADMIN — OXYCODONE HYDROCHLORIDE 10 MILLIGRAM(S): 5 TABLET ORAL at 12:18

## 2019-05-30 RX ADMIN — Medication 300 MILLIGRAM(S): at 13:23

## 2019-05-30 RX ADMIN — ATORVASTATIN CALCIUM 10 MILLIGRAM(S): 80 TABLET, FILM COATED ORAL at 23:06

## 2019-05-30 RX ADMIN — Medication 975 MILLIGRAM(S): at 12:19

## 2019-05-30 NOTE — DISCHARGE NOTE PROVIDER - CARE PROVIDER_API CALL
Alex Herring)  Orthopaedic Surgery  [address]  [place] [address]  Phone: [phone]  Fax: [phone]  Follow Up Time:

## 2019-05-30 NOTE — DISCHARGE NOTE PROVIDER - NSDCFUADDINST_GEN_ALL_CORE_FT
The patient will be seen in the office between 2-3 weeks for wound check. PLEASE CONTACT OFFICE TO ARRANGE FOLLOW-UP APPOINTMENT DATE. Tape will be removed at that time. Patient may shower after post-op day #5. The dressing is to be removed on 7. IF THE DRESSING BECOMES SOILED BEFORE THE REMOVAL DATE, CHANGE WITH A SIMILAR DRESSING. IF THE DRESSING BECOMES STAINED WITH DISCHARGE, CONTACT THE OFFICE FOR FURTHER DIRECTIONS.  The patient will contact the office if the wound becomes red, has increasing pain, develops bleeding or discharge, an injury occurs, or has other concerns. The patient will continue PT consistent with posterior total hip replacement protocol. The patient will continue to practice posterior total hip precautions for a minimum of 6 week. The patient will continue ASPIRIN 325mg two times a day for 6 weeks for blood clot prevention. The patient will take OXYCODONE AND TYLENOL for pain control and titrate according to prescription and patient needs. The patient will take Colace while taking oxycodone to prevent narcotic associated constipation.  Additionally, increase water intake (drink at least 8 glasses of water daily) and try adding fiber to the diet by eating fruits, vegetables and foods that are rich in grains. If constipation is experienced, contact the medical/primary care provider to discuss further treatment options. The patient is FULL weight bearing. The patient will be seen in the office between 2 weeks for wound check. PLEASE CONTACT OFFICE TO ARRANGE FOLLOW-UP APPOINTMENT DATE. Tape will be removed at that time. Patient may shower after post-op day #5. The dressing is to be removed on 7. IF THE DRESSING BECOMES SOILED BEFORE THE REMOVAL DATE, CHANGE WITH A SIMILAR DRESSING. IF THE DRESSING BECOMES STAINED WITH DISCHARGE, CONTACT THE OFFICE FOR FURTHER DIRECTIONS.  The patient will contact the office if the wound becomes red, has increasing pain, develops bleeding or discharge, an injury occurs, or has other concerns. The patient will continue PT consistent with posterior total hip replacement protocol. The patient will continue to practice posterior total hip precautions for a minimum of 6 week. The patient will continue ASPIRIN 325mg two times a day for 6 weeks for blood clot prevention. The patient will take OXYCODONE AND TYLENOL for pain control and titrate according to prescription and patient needs. The patient will take Colace while taking oxycodone to prevent narcotic associated constipation.  Additionally, increase water intake (drink at least 8 glasses of water daily) and try adding fiber to the diet by eating fruits, vegetables and foods that are rich in grains. If constipation is experienced, contact the medical/primary care provider to discuss further treatment options. The patient is FULL weight bearing.

## 2019-05-30 NOTE — DISCHARGE NOTE PROVIDER - HOSPITAL COURSE
The patient underwent a RIGHT POSTERIOR TOTAL HIP REPLACEMENT on [id number].    The patient received antibiotics consistent with SCIP guidelines. The patient underwent the procedure and had no intra-operative complications. Post-operatively, the patient was seen by medicine and PT. The patient received Aspirin for DVTP. The patient received pain medications per orthopedic pain management protocol and the pain was appropriately controlled. Patient was instructed on posterior total hip precautions by PT. The patient did not have any post-operative medical complications. The patient was discharged in stable condition. The patient underwent a RIGHT POSTERIOR TOTAL HIP REPLACEMENT on 5/30/2019.    The patient received antibiotics consistent with SCIP guidelines. The patient underwent the procedure and had no intra-operative complications. Post-operatively, the patient was seen by medicine and PT. The patient received Aspirin for DVTP. The patient received pain medications per orthopedic pain management protocol and the pain was appropriately controlled. Patient was instructed on posterior total hip precautions by PT. The patient did not have any post-operative medical complications. The patient was discharged in stable condition.

## 2019-05-31 ENCOUNTER — TRANSCRIPTION ENCOUNTER (OUTPATIENT)
Age: 74
End: 2019-05-31

## 2019-05-31 VITALS
RESPIRATION RATE: 20 BRPM | SYSTOLIC BLOOD PRESSURE: 120 MMHG | TEMPERATURE: 97 F | OXYGEN SATURATION: 96 % | HEART RATE: 52 BPM | DIASTOLIC BLOOD PRESSURE: 70 MMHG

## 2019-05-31 LAB
ANION GAP SERPL CALC-SCNC: 12 MMOL/L — SIGNIFICANT CHANGE UP (ref 5–17)
BUN SERPL-MCNC: 21 MG/DL — HIGH (ref 8–20)
CALCIUM SERPL-MCNC: 8.7 MG/DL — SIGNIFICANT CHANGE UP (ref 8.6–10.2)
CHLORIDE SERPL-SCNC: 101 MMOL/L — SIGNIFICANT CHANGE UP (ref 98–107)
CO2 SERPL-SCNC: 26 MMOL/L — SIGNIFICANT CHANGE UP (ref 22–29)
CREAT SERPL-MCNC: 0.66 MG/DL — SIGNIFICANT CHANGE UP (ref 0.5–1.3)
GLUCOSE SERPL-MCNC: 112 MG/DL — SIGNIFICANT CHANGE UP (ref 70–115)
HCT VFR BLD CALC: 37.2 % — LOW (ref 42–52)
HGB BLD-MCNC: 12.6 G/DL — LOW (ref 14–18)
MCHC RBC-ENTMCNC: 32.2 PG — HIGH (ref 27–31)
MCHC RBC-ENTMCNC: 33.9 G/DL — SIGNIFICANT CHANGE UP (ref 32–36)
MCV RBC AUTO: 95.1 FL — HIGH (ref 80–94)
PLATELET # BLD AUTO: 129 K/UL — LOW (ref 150–400)
POTASSIUM SERPL-MCNC: 4.2 MMOL/L — SIGNIFICANT CHANGE UP (ref 3.5–5.3)
POTASSIUM SERPL-SCNC: 4.2 MMOL/L — SIGNIFICANT CHANGE UP (ref 3.5–5.3)
RBC # BLD: 3.91 M/UL — LOW (ref 4.6–6.2)
RBC # FLD: 13.8 % — SIGNIFICANT CHANGE UP (ref 11–15.6)
SODIUM SERPL-SCNC: 139 MMOL/L — SIGNIFICANT CHANGE UP (ref 135–145)
WBC # BLD: 9.1 K/UL — SIGNIFICANT CHANGE UP (ref 4.8–10.8)
WBC # FLD AUTO: 9.1 K/UL — SIGNIFICANT CHANGE UP (ref 4.8–10.8)

## 2019-05-31 PROCEDURE — C1713: CPT

## 2019-05-31 PROCEDURE — 97163 PT EVAL HIGH COMPLEX 45 MIN: CPT

## 2019-05-31 PROCEDURE — 97110 THERAPEUTIC EXERCISES: CPT

## 2019-05-31 PROCEDURE — 82962 GLUCOSE BLOOD TEST: CPT

## 2019-05-31 PROCEDURE — 97167 OT EVAL HIGH COMPLEX 60 MIN: CPT

## 2019-05-31 PROCEDURE — 97116 GAIT TRAINING THERAPY: CPT

## 2019-05-31 PROCEDURE — 99222 1ST HOSP IP/OBS MODERATE 55: CPT

## 2019-05-31 PROCEDURE — C1776: CPT

## 2019-05-31 PROCEDURE — 85027 COMPLETE CBC AUTOMATED: CPT

## 2019-05-31 PROCEDURE — 73501 X-RAY EXAM HIP UNI 1 VIEW: CPT

## 2019-05-31 PROCEDURE — 80048 BASIC METABOLIC PNL TOTAL CA: CPT

## 2019-05-31 PROCEDURE — 36415 COLL VENOUS BLD VENIPUNCTURE: CPT

## 2019-05-31 RX ORDER — ASPIRIN/CALCIUM CARB/MAGNESIUM 324 MG
1 TABLET ORAL
Qty: 84 | Refills: 0
Start: 2019-05-31 | End: 2019-07-11

## 2019-05-31 RX ORDER — OXYCODONE HYDROCHLORIDE 5 MG/1
1 TABLET ORAL
Qty: 42 | Refills: 0
Start: 2019-05-31 | End: 2019-06-06

## 2019-05-31 RX ORDER — SENNOSIDES/DOCUSATE SODIUM 8.6MG-50MG
2 TABLET ORAL
Qty: 14 | Refills: 0
Start: 2019-05-31 | End: 2019-06-06

## 2019-05-31 RX ADMIN — Medication 100 MILLIGRAM(S): at 06:11

## 2019-05-31 RX ADMIN — Medication 15 MILLIGRAM(S): at 05:54

## 2019-05-31 RX ADMIN — OXYCODONE HYDROCHLORIDE 10 MILLIGRAM(S): 5 TABLET ORAL at 05:55

## 2019-05-31 RX ADMIN — Medication 100 MILLIGRAM(S): at 06:12

## 2019-05-31 RX ADMIN — Medication 100 MILLIGRAM(S): at 05:54

## 2019-05-31 RX ADMIN — Medication 975 MILLIGRAM(S): at 00:45

## 2019-05-31 RX ADMIN — OXYCODONE HYDROCHLORIDE 10 MILLIGRAM(S): 5 TABLET ORAL at 06:45

## 2019-05-31 RX ADMIN — Medication 325 MILLIGRAM(S): at 14:14

## 2019-05-31 RX ADMIN — SODIUM CHLORIDE 3 MILLILITER(S): 9 INJECTION INTRAMUSCULAR; INTRAVENOUS; SUBCUTANEOUS at 14:00

## 2019-05-31 RX ADMIN — Medication 975 MILLIGRAM(S): at 14:14

## 2019-05-31 RX ADMIN — Medication 100 MICROGRAM(S): at 05:55

## 2019-05-31 RX ADMIN — Medication 325 MILLIGRAM(S): at 05:54

## 2019-05-31 RX ADMIN — Medication 100 MILLIGRAM(S): at 14:14

## 2019-05-31 RX ADMIN — Medication 975 MILLIGRAM(S): at 15:00

## 2019-05-31 RX ADMIN — Medication 300 MILLIGRAM(S): at 01:13

## 2019-05-31 RX ADMIN — Medication 15 MILLIGRAM(S): at 06:08

## 2019-05-31 RX ADMIN — Medication 975 MILLIGRAM(S): at 06:45

## 2019-05-31 RX ADMIN — SODIUM CHLORIDE 3 MILLILITER(S): 9 INJECTION INTRAMUSCULAR; INTRAVENOUS; SUBCUTANEOUS at 06:16

## 2019-05-31 RX ADMIN — Medication 975 MILLIGRAM(S): at 05:54

## 2019-05-31 NOTE — CONSULT NOTE ADULT - ASSESSMENT
73yr old male with PMH of HTN, Hypothyroidism m chronic rt hip pain 2/2 OA presented for an elective rt hip replacement, He is s/p right total hip arthroplasty.      # Rt hip OA - s/p Rt SHEFALI   # Rt total hip replacement - Pain control   Bowel regimen   Incentive spirometry  DVT px - per ortho   PT    # HTN - resume home meds tomorrow  # HPL - ct home meds  # acute blood loss anemia 2/2 surgical blood loss- HD stable - fu with PMD in 1-2 mths    Medically stable for discharge. No new changes to home medications for chronic medical illnesses recommended.

## 2019-05-31 NOTE — OCCUPATIONAL THERAPY INITIAL EVALUATION ADULT - AMBULATORY DEVICES NEEDED
Goals and plans for this visit:    Follow up labs    Follow up brain MRI given history of nausea and sleep disturbances with dizziness   Call [facility] Referral line to make an appointment for any referrals given during todays visit. [phone]    Return for yearly physical with pap    
no

## 2019-05-31 NOTE — CONSULT NOTE ADULT - SUBJECTIVE AND OBJECTIVE BOX
PMD : Trey  Cardio :  Davidson    chief complaint : Right hip pain      HPI: 73yr old male with PMH of HTN, Hypothyroidism m chronic rt hip pain 2/2 OA presented for an elective rt hip replacement, He is s/p right total hip arthroplasty POD#1      PAST MEDICAL & SURGICAL HISTORY:  High cholesterol  Hypothyroidism  Hypertension  S/P bilateral inguinal hernia repair  H/O total knee replacement, right: 2010  History of hip replacement: left 2011      Social History:  Tabacco -   ETOH -   Illicit drug abuse - denies    FAMILY HISTORY:  FH: prostate cancer  FH: throat cancer: mother      Allergies    No Known Allergies    Intolerances        HOME MEDICATIONS :   · 	bisoprolol-hydrochlorothiazide 10 mg-6.25 mg oral tablet: Last Dose Taken:  , 1 tab(s) orally once a day  · 	levothyroxine 100 mcg (0.1 mg) oral tablet: Last Dose Taken:  , 1 tab(s) orally once a day  · 	atorvastatin 10 mg oral tablet: Last Dose Taken:  , 1 tab(s) orally once a day  · 	Naprosyn 375 mg oral tablet: Last Dose Taken:  , 1 tab(s) orally 2 times a day, As Needed  · 	Vitamin B12 1000 mcg oral tablet: Last Dose Taken:  , 1 tab(s) orally once a day  · 	Vitamin D3 1000 intl units oral capsule: Last Dose Taken:  , 1 cap(s) orally once a day  · 	Multiple Vitamins oral capsule: Last Dose Taken:  , 1 cap(s) orally once a day    REVIEW OF SYSTEMS:    CONSTITUTIONAL: No fever, weight loss, or fatigue  EYES: No eye pain, visual disturbances, or discharge  NECK: No pain or stiffness  RESPIRATORY: No cough, wheezing, chills or hemoptysis; No shortness of breath  CARDIOVASCULAR: No chest pain, palpitations, dizziness, or leg swelling  GASTROINTESTINAL: No abdominal or epigastric pain. No nausea, vomiting, or hematemesis; No diarrhea or constipation. No melena or hematochezia.  GENITOURINARY: No dysuria, frequency, hematuria, or incontinence  NEUROLOGICAL: No headaches, memory loss, loss of strength, numbness, or tremors  SKIN: No itching, burning, rashes, or lesions   LYMPH NODES: No enlarged glands  ENDOCRINE: No heat or cold intolerance; No hair loss  MUSCULOSKELETAL:   PSYCHIATRIC: No depression, anxiety, mood swings, or difficulty sleeping  HEME/LYMPH: No easy bruising, or bleeding gums  ALLERGY AND IMMUNOLOGIC: No hives or eczema    MEDICATIONS  (STANDING):  acetaminophen   Tablet .. 975 milliGRAM(s) Oral every 8 hours  aspirin enteric coated 325 milliGRAM(s) Oral two times a day  atorvastatin 10 milliGRAM(s) Oral at bedtime  docusate sodium 100 milliGRAM(s) Oral three times a day  ferrous    sulfate 325 milliGRAM(s) Oral daily  lactated ringers. 1000 milliLiter(s) (100 mL/Hr) IV Continuous <Continuous>  lactated ringers. 1000 milliLiter(s) (100 mL/Hr) IV Continuous <Continuous>  levothyroxine 100 MICROGram(s) Oral daily  metoprolol tartrate 100 milliGRAM(s) Oral two times a day  oxyCODONE  ER Tablet 10 milliGRAM(s) Oral every 12 hours  senna 2 Tablet(s) Oral at bedtime  sodium chloride 0.9% lock flush 3 milliLiter(s) IV Push every 8 hours    MEDICATIONS  (PRN):  acetaminophen   Tablet .. 650 milliGRAM(s) Oral every 6 hours PRN Temp greater or equal to 38C (100.4F)  aluminum hydroxide/magnesium hydroxide/simethicone Suspension 30 milliLiter(s) Oral four times a day PRN Indigestion  HYDROmorphone  Injectable 0.5 milliGRAM(s) IV Push every 4 hours PRN Severe Pain (7 - 10)/breakthrough pain  magnesium hydroxide Suspension 30 milliLiter(s) Oral daily PRN Constipation  ondansetron Injectable 4 milliGRAM(s) IV Push every 6 hours PRN Nausea and/or Vomiting  ondansetron Injectable 4 milliGRAM(s) IV Push once PRN Nausea and/or Vomiting  oxyCODONE    IR 5 milliGRAM(s) Oral every 4 hours PRN Mild Pain (1 - 3)  oxyCODONE    IR 10 milliGRAM(s) Oral every 4 hours PRN Moderate Pain (4 - 6)      Vital Signs Last 24 Hrs  T(C): 36.2 (31 May 2019 08:52), Max: 36.7 (31 May 2019 00:33)  T(F): 97.2 (31 May 2019 08:52), Max: 98.1 (31 May 2019 00:33)  HR: 52 (31 May 2019 08:52) (42 - 75)  BP: 120/70 (31 May 2019 08:52) (108/70 - 149/62)  BP(mean): --  RR: 20 (31 May 2019 08:52) (14 - 20)  SpO2: 96% (31 May 2019 08:52) (95% - 100%)    PHYSICAL EXAM:    GENERAL: NAD, well-groomed, well-developed  HEAD:  Atraumatic, Normocephalic  EYES: EOMI, PERRLA, conjunctiva and sclera clear  NECK: Supple, No JVD, Normal thyroid  NERVOUS SYSTEM:  Alert & Oriented X3, Good concentration; Motor Strength 5/5 B/L upper and lower extremities; DTRs 2+ intact and symmetric  CHEST/LUNG: CTA  b/l,  no rales, rhonchi, wheezing, or rubs  HEART: Regular rate and rhythm; No murmurs, rubs, or gallops  ABDOMEN: Soft, Nontender, Nondistended; Bowel sounds present  EXTREMITIES:  2+ Peripheral Pulses, No clubbing, cyanosis, or edema ,   LYMPH: No lymphadenopathy noted  SKIN: No rashes or lesions    LABS:                        12.6   9.1   )-----------( 129      ( 31 May 2019 06:09 )             37.2     05-31    139  |  101  |  21.0<H>  ----------------------------<  112  4.2   |  26.0  |  0.66    Ca    8.7      31 May 2019 06:09              RADIOLOGY & ADDITIONAL STUDIES:    EKG- sinus bradycardia , LAFB  Nuclear stress test - no ischemia  ECHO 5/.2019 - Normal LV function, Mild - mod MR    office notes from PMD and cardio - reviewed PMD : Trey  Cardio :  Davidson    chief complaint : Right hip pain      HPI: 73yr old male with PMH of HTN, Hypothyroidism m chronic rt hip pain 2/2 OA presented for an elective rt hip replacement, He is s/p right total hip arthroplasty POD#1. Doing well, denies any dizziness/nausea/vomiting. wants to go home.       PAST MEDICAL & SURGICAL HISTORY:  High cholesterol  Hypothyroidism  Hypertension  S/P bilateral inguinal hernia repair  H/O total knee replacement, right: 2010  History of hip replacement: left 2011      Social History:  Tabacco - denies  ETOH - denies  Illicit drug abuse - denies    FAMILY HISTORY:  FH: prostate cancer  FH: throat cancer: mother      Allergies    No Known Allergies    Intolerances        HOME MEDICATIONS :   · 	bisoprolol-hydrochlorothiazide 10 mg-6.25 mg oral tablet: Last Dose Taken:  , 1 tab(s) orally once a day  · 	levothyroxine 100 mcg (0.1 mg) oral tablet: Last Dose Taken:  , 1 tab(s) orally once a day  · 	atorvastatin 10 mg oral tablet: Last Dose Taken:  , 1 tab(s) orally once a day  · 	Naprosyn 375 mg oral tablet: Last Dose Taken:  , 1 tab(s) orally 2 times a day, As Needed  · 	Vitamin B12 1000 mcg oral tablet: Last Dose Taken:  , 1 tab(s) orally once a day  · 	Vitamin D3 1000 intl units oral capsule: Last Dose Taken:  , 1 cap(s) orally once a day  · 	Multiple Vitamins oral capsule: Last Dose Taken:  , 1 cap(s) orally once a day    REVIEW OF SYSTEMS:    CONSTITUTIONAL: No fever, weight loss, or fatigue  EYES: No eye pain, visual disturbances, or discharge  NECK: No pain or stiffness  RESPIRATORY: No cough, wheezing, chills or hemoptysis; No shortness of breath  CARDIOVASCULAR: No chest pain, palpitations, dizziness, or leg swelling  GASTROINTESTINAL: No abdominal or epigastric pain. No nausea, vomiting  GENITOURINARY: No dysuria, frequency, hematuria, or incontinence  NEUROLOGICAL: No headaches, memory loss, loss of strength, numbness, or tremors  SKIN: No itching, burning, rashes, or lesions   LYMPH NODES: No enlarged glands  ENDOCRINE: No heat or cold intolerance  PSYCHIATRIC: No depression, anxiety, mood swings, or difficulty sleeping      MEDICATIONS  (STANDING):  acetaminophen   Tablet .. 975 milliGRAM(s) Oral every 8 hours  aspirin enteric coated 325 milliGRAM(s) Oral two times a day  atorvastatin 10 milliGRAM(s) Oral at bedtime  docusate sodium 100 milliGRAM(s) Oral three times a day  ferrous    sulfate 325 milliGRAM(s) Oral daily  lactated ringers. 1000 milliLiter(s) (100 mL/Hr) IV Continuous <Continuous>  lactated ringers. 1000 milliLiter(s) (100 mL/Hr) IV Continuous <Continuous>  levothyroxine 100 MICROGram(s) Oral daily  metoprolol tartrate 100 milliGRAM(s) Oral two times a day  oxyCODONE  ER Tablet 10 milliGRAM(s) Oral every 12 hours  senna 2 Tablet(s) Oral at bedtime  sodium chloride 0.9% lock flush 3 milliLiter(s) IV Push every 8 hours    MEDICATIONS  (PRN):  acetaminophen   Tablet .. 650 milliGRAM(s) Oral every 6 hours PRN Temp greater or equal to 38C (100.4F)  aluminum hydroxide/magnesium hydroxide/simethicone Suspension 30 milliLiter(s) Oral four times a day PRN Indigestion  HYDROmorphone  Injectable 0.5 milliGRAM(s) IV Push every 4 hours PRN Severe Pain (7 - 10)/breakthrough pain  magnesium hydroxide Suspension 30 milliLiter(s) Oral daily PRN Constipation  ondansetron Injectable 4 milliGRAM(s) IV Push every 6 hours PRN Nausea and/or Vomiting  ondansetron Injectable 4 milliGRAM(s) IV Push once PRN Nausea and/or Vomiting  oxyCODONE    IR 5 milliGRAM(s) Oral every 4 hours PRN Mild Pain (1 - 3)  oxyCODONE    IR 10 milliGRAM(s) Oral every 4 hours PRN Moderate Pain (4 - 6)      Vital Signs Last 24 Hrs  T(C): 36.2 (31 May 2019 08:52), Max: 36.7 (31 May 2019 00:33)  T(F): 97.2 (31 May 2019 08:52), Max: 98.1 (31 May 2019 00:33)  HR: 52 (31 May 2019 08:52) (42 - 75)  BP: 120/70 (31 May 2019 08:52) (108/70 - 149/62)  BP(mean): --  RR: 20 (31 May 2019 08:52) (14 - 20)  SpO2: 96% (31 May 2019 08:52) (95% - 100%)    PHYSICAL EXAM:    GENERAL: NAD, well-groomed, well-developed  HEAD:  Atraumatic, Normocephalic  EYES: EOMI, PERRLA, conjunctiva and sclera clear  NECK: Supple, No JVD  NERVOUS SYSTEM:  Alert & Oriented X3, Good concentration  CHEST/LUNG: CTA  b/l,  no rales, rhonchi, wheezing, or rubs  HEART: Regular rate and rhythm; No murmurs  EXTREMITIES:   No clubbing, cyanosis, or edema  SKIN: No rashes or lesions    LABS:                        12.6   9.1   )-----------( 129      ( 31 May 2019 06:09 )             37.2     05-31    139  |  101  |  21.0<H>  ----------------------------<  112  4.2   |  26.0  |  0.66    Ca    8.7      31 May 2019 06:09              RADIOLOGY & ADDITIONAL STUDIES:    EKG- sinus bradycardia , LAFB  Nuclear stress test - no ischemia  ECHO 5/.2019 - Normal LV function, Mild - mod MR    office notes from PMD and cardio - reviewed

## 2019-05-31 NOTE — PHYSICAL THERAPY INITIAL EVALUATION ADULT - GAIT TRAINING, PT EVAL
Time Frame:   1  days   Goal:  Modified Independent with RW x 300 feet / Stairs: pt is Modified Independent with RW with 3 steps

## 2019-05-31 NOTE — OCCUPATIONAL THERAPY INITIAL EVALUATION ADULT - ADDITIONAL COMMENTS
Pt has walk-in shower with doors and grab bars  Pt owns a RW, ST cane, commode, and shower chair  Pt is right handed

## 2019-05-31 NOTE — PHYSICAL THERAPY INITIAL EVALUATION ADULT - ADDITIONAL COMMENTS
Pt lives in a house with 3 steps to enter with  rails and 0 stairs inside.  Pt owns medical equipment: SAC, RW, Commode, shower chair   Pt lives with: spouse: They are available to provide 24hr care

## 2019-05-31 NOTE — DISCHARGE NOTE NURSING/CASE MANAGEMENT/SOCIAL WORK - NSDCDPATPORTLINK_GEN_ALL_CORE
You can access the [facility] Patient Portal, offered by [facility], by registering with the following website: [email or web address]

## 2019-06-18 ENCOUNTER — OTHER (OUTPATIENT)
Age: 74
End: 2019-06-18

## 2019-06-19 ENCOUNTER — APPOINTMENT (OUTPATIENT)
Dept: ORTHOPEDIC SURGERY | Facility: CLINIC | Age: 74
End: 2019-06-19
Payer: MEDICARE

## 2019-06-19 VITALS
WEIGHT: 194 LBS | HEIGHT: 69 IN | BODY MASS INDEX: 28.73 KG/M2 | SYSTOLIC BLOOD PRESSURE: 130 MMHG | DIASTOLIC BLOOD PRESSURE: 79 MMHG | HEART RATE: 57 BPM

## 2019-06-19 PROCEDURE — 73502 X-RAY EXAM HIP UNI 2-3 VIEWS: CPT | Mod: RT

## 2019-06-19 PROCEDURE — 99024 POSTOP FOLLOW-UP VISIT: CPT

## 2019-06-19 NOTE — HISTORY OF PRESENT ILLNESS
[___ Weeks Post Op] : [unfilled] weeks post op [Clean/Dry/Intact] : clean, dry and intact [Xray (Date:___)] : [unfilled] Xray -  [Doing Well] : is doing well [Excellent Pain Control] : has excellent pain control [No Sign of Infection] : is showing no signs of infection [0] : no pain reported [Nausea] : no nausea [Fever] : no fever [Chills] : no chills [Vomiting] : no vomiting [Healed] : not healed [Erythema] : not erythematous [Discharge] : absent of discharge [Swelling] : not swollen [Dehiscence] : not dehisced [de-identified] : Patient is a 73 year old male who is s/p right SHEFALI on 5/30/19. He states he is doing well. He denies pain in office today. He denies fevers/chills. He is on 325 ASA BID for DVT prophylaxis.  [de-identified] : S/P Right SHEFALI DOS:05/30/2019 [de-identified] : 3V xray of the right hip and pelvis done in office today and reviewed by Dr. Alex Herring demonstrates s/p THR with implants in good positioning, no sign of wear, loosening or subsidence.  [de-identified] : 73 year old male S/P Right SHEFALI DOS:05/30/2019. He is doing very well 3 weeks from his surgery. Xrays were reviewed and the patient was reassured that their THR components are in good position with no signs of loosening or wear. Hip dislocation precautions were discussed. I discussed the importance of antibiotic use with any dental work. I encouraged him to continue with outpatient PT and I provided him with a prescription for PT. It is also appropriate for him to receive a handicapped parking permit, and I provided him with an application. F/U 3-4 weeks.  [de-identified] : Exam of the right hip shows a well healing incision with no signs of infection, FROM, mildly antalgic gait without a cane, equal leg lengths

## 2019-06-19 NOTE — ADDENDUM
[FreeTextEntry1] : I, Yang Johnson, acted solely as a scribe for Dr. Alex Herring on this date 06/19/2019.

## 2019-07-23 ENCOUNTER — APPOINTMENT (OUTPATIENT)
Dept: ORTHOPEDIC SURGERY | Facility: CLINIC | Age: 74
End: 2019-07-23
Payer: MEDICARE

## 2019-07-23 VITALS
HEART RATE: 50 BPM | BODY MASS INDEX: 28.73 KG/M2 | WEIGHT: 194 LBS | HEIGHT: 69 IN | DIASTOLIC BLOOD PRESSURE: 96 MMHG | SYSTOLIC BLOOD PRESSURE: 172 MMHG

## 2019-07-23 PROCEDURE — 99024 POSTOP FOLLOW-UP VISIT: CPT

## 2019-07-23 PROCEDURE — 73502 X-RAY EXAM HIP UNI 2-3 VIEWS: CPT | Mod: TC,RT

## 2019-07-23 NOTE — HISTORY OF PRESENT ILLNESS
[0] : no pain reported [Healed] : healed [Neuro Intact] : an unremarkable neurological exam [Vascular Intact] : ~T peripheral vascular exam normal [Xray (Date:___)] : [unfilled] Xray -  [Negative Mary's] : maneuvers demonstrated a negative Mary's sign [Excellent Pain Control] : has excellent pain control [No Sign of Infection] : is showing no signs of infection [Doing Well] : is doing well [Chills] : no chills [Constipation] : no constipation [Diarrhea] : no diarrhea [Dysuria] : no dysuria [Fever] : no fever [Nausea] : no nausea [Vomiting] : no vomiting [Erythema] : not erythematous [Discharge] : absent of discharge [Swelling] : not swollen [Dehiscence] : not dehisced [de-identified] : S/P Right SHEFALI DOS:05/30/2019.  [de-identified] : Examination of right hip demonstrate well-healed incision. Smooth painless range of motion \par  [de-identified] : Patient is a 7 weeks from right total hip arthroplasty he denies any pain in the hip he is satisfied with surgical outcome he is in physical therapy twice a week also going to the gym for self exercise his walking without a cane  [de-identified] : 3 view X-ray of right hip demonstrate implants are in good alignment. No evidence of subsidence or loosening or wear  [de-identified] : Patient will continue with exercise and adhere to the posterior hip precaution. we discussed use of antibiotic before dental work for 2 years. f/u in 1 month.\par

## 2019-08-21 ENCOUNTER — APPOINTMENT (OUTPATIENT)
Dept: ORTHOPEDIC SURGERY | Facility: CLINIC | Age: 74
End: 2019-08-21
Payer: MEDICARE

## 2019-08-21 DIAGNOSIS — Z47.1 AFTERCARE FOLLOWING JOINT REPLACEMENT SURGERY: ICD-10-CM

## 2019-08-21 DIAGNOSIS — Z96.641 PRESENCE OF RIGHT ARTIFICIAL HIP JOINT: ICD-10-CM

## 2019-08-21 DIAGNOSIS — Z96.641 AFTERCARE FOLLOWING JOINT REPLACEMENT SURGERY: ICD-10-CM

## 2019-08-21 PROCEDURE — 73502 X-RAY EXAM HIP UNI 2-3 VIEWS: CPT | Mod: RT

## 2019-08-21 PROCEDURE — 99024 POSTOP FOLLOW-UP VISIT: CPT

## 2019-08-21 NOTE — ADDENDUM
[FreeTextEntry1] : I, Yang Johnson, acted solely as a scribe for Dr. Alex Herring on this date 08/21/2019.

## 2019-08-21 NOTE — HISTORY OF PRESENT ILLNESS
[___ Months Post Op] : [unfilled] months post op [Clean/Dry/Intact] : clean, dry and intact [Healed] : healed [Xray (Date:___)] : [unfilled] Xray -  [Doing Well] : is doing well [Excellent Pain Control] : has excellent pain control [No Sign of Infection] : is showing no signs of infection [0] : no pain reported [Chills] : no chills [Constipation] : no constipation [Diarrhea] : no diarrhea [Fever] : no fever [Dysuria] : no dysuria [Vomiting] : no vomiting [Nausea] : no nausea [Discharge] : absent of discharge [Erythema] : not erythematous [Swelling] : not swollen [de-identified] : S/P Right SHEFALI DOS:05/30/2019.  [Dehiscence] : not dehisced [de-identified] : 73 year old male here for evaluation S/P Right SHEFALI DOS:05/30/2019.  Patient states no complaints. He denies right hip pain today. He denies limitations to function or activities. He has returned to the gym. He finished PT 2 weeks ago. Overall no complaints [de-identified] : 3V xray of the right hip and pelvis done in office today and reviewed by Dr. Alex Herring demonstrates s/p SHEFALI with implants in good positioning, no sign of wear, loosening or subsidence.  [de-identified] : Right hip exam shows well-healed incision. Smooth painless range of motion, no signs of infection. [de-identified] : 73 year old male S/P Right SHEFALI DOS:05/30/2019. He is doing very well about 3 months from surgery. Xrays were reviewed and the patient was reassured that their SHEFALI components are in good position with no signs of loosening or wear. Hip dislocation precautions were discussed. I discussed the importance of antibiotic use with any dental work. F/U annually for radiographic surveillance of right SHEFALI components.

## 2019-10-18 NOTE — PROGRESS NOTE ADULT - SUBJECTIVE AND OBJECTIVE BOX
Ortho Post Op Check    Name: ZENOBIA KEENE    MR #: [id number]    Procedure: right total hip arthroplasty posterior  Surgeon: Nett    Pt comfortable without complaints, pain controlled  Denies CP, SOB, N/V, numbness/tingling     General Exam:  Vital Signs Last 24 Hrs  T(C): 36.3 (05-30-19 @ 20:40), Max: 36.3 (05-30-19 @ 20:40)  T(F): 97.4 (05-30-19 @ 20:40), Max: 97.4 (05-30-19 @ 20:40)  HR: 54 (05-30-19 @ 20:40) (42 - 61)  BP: 127/68 (05-30-19 @ 20:40) (111/67 - 149/62)  BP(mean): --  RR: 20 (05-30-19 @ 20:40) (14 - 20)  SpO2: 98% (05-30-19 @ 20:40) (98% - 100%)    General: Pt Alert and oriented, NAD, controlled pain.  Dressings C/D/I. No bleeding.  Pulses: 2+ dorsalis pedis pulse. Cap refill < 2 sec.  Sensation: Grossly intact to light touch without deficit.  Motor: + EHL/FHL/TA/GS    Post-op X-Ray:    Pelvis & hip films reviewed. Implants are in appropriate position. No fracture or dislocation noted. Patient is WBAT of the surgical extremity.     A/P: 73yMale POD#0 s/p right total hip arthroplasty posterior   - Stable  - Pain Control  - DVT ppx: ASA  - Post op abx: ancef/vanco  - Weight bearing status: wbat
Pelvis & hip films reviewed. Implants are in appropriate position. No fracture or dislocation noted. Patient is WBAT of the surgical extremity.
ZENOBIA JASSI    [id number]    History: Patient is status post right posterior total hip arthroplasty POD #1.Patient is doing well. The patient's pain is controlled using the prescribed pain medications. The patient is participating in physical therapy this afternoon and states he is eager to go home today. Denies nausea, vomiting, chest pain, shortness of breath, abdominal pain, fever/chills, dizziness, HA. No new complaints.    Vital Signs Last 24 Hrs  T(C): 36.6 (31 May 2019 05:36), Max: 36.7 (30 May 2019 12:08)  T(F): 97.8 (31 May 2019 05:36), Max: 98.1 (30 May 2019 12:08)  HR: 55 (31 May 2019 05:36) (42 - 75)  BP: 138/71 (31 May 2019 05:36) (108/70 - 149/62)  BP(mean): --  RR: 20 (31 May 2019 05:36) (13 - 20)  SpO2: 98% (31 May 2019 05:36) (95% - 100%)                          12.6   9.1   )-----------( 129      ( 31 May 2019 06:09 )             37.2       05-31    139  |  101  |  21.0<H>  ----------------------------<  112  4.2   |  26.0  |  0.66    Ca    8.7      31 May 2019 06:09        MEDICATIONS  (STANDING):  acetaminophen   Tablet .. 975 milliGRAM(s) Oral every 8 hours  aspirin enteric coated 325 milliGRAM(s) Oral two times a day  atorvastatin 10 milliGRAM(s) Oral at bedtime  docusate sodium 100 milliGRAM(s) Oral three times a day  ferrous    sulfate 325 milliGRAM(s) Oral daily  lactated ringers. 1000 milliLiter(s) (100 mL/Hr) IV Continuous <Continuous>  lactated ringers. 1000 milliLiter(s) (100 mL/Hr) IV Continuous <Continuous>  levothyroxine 100 MICROGram(s) Oral daily  metoprolol tartrate 100 milliGRAM(s) Oral two times a day  oxyCODONE  ER Tablet 10 milliGRAM(s) Oral every 12 hours  senna 2 Tablet(s) Oral at bedtime  sodium chloride 0.9% lock flush 3 milliLiter(s) IV Push every 8 hours    MEDICATIONS  (PRN):  acetaminophen   Tablet .. 650 milliGRAM(s) Oral every 6 hours PRN Temp greater or equal to 38C (100.4F)  aluminum hydroxide/magnesium hydroxide/simethicone Suspension 30 milliLiter(s) Oral four times a day PRN Indigestion  fentaNYL    Injectable 25 MICROGram(s) IV Push every 5 minutes PRN Moderate Pain (4 - 6)  HYDROmorphone  Injectable 0.5 milliGRAM(s) IV Push every 4 hours PRN Severe Pain (7 - 10)/breakthrough pain  magnesium hydroxide Suspension 30 milliLiter(s) Oral daily PRN Constipation  ondansetron Injectable 4 milliGRAM(s) IV Push every 6 hours PRN Nausea and/or Vomiting  ondansetron Injectable 4 milliGRAM(s) IV Push once PRN Nausea and/or Vomiting  oxyCODONE    IR 5 milliGRAM(s) Oral every 4 hours PRN Mild Pain (1 - 3)  oxyCODONE    IR 10 milliGRAM(s) Oral every 4 hours PRN Moderate Pain (4 - 6)    General: A&Ox3, NAD, lying comfortable in bed.  Physical exam: The right hip dressing changed incision site is clean, dry and intact. No drainage or discharge. No erythema is noted. No blistering. No ecchymosis. Op site dressing placed. The calf is supple nontender B/L. Sensation to light touch is grossly intact distally. Motor function distally is 5/5. No foot drop. 2+ dorsalis pedis pulse. Capillary refill is less than 2 seconds. No cyanosis.    Primary Orthopedic Assessment:  • s/p RIGHT POSTERIOR total hip replacement POD #1    Plan:   • DVT prophylaxis as prescribed, including use of compression devices and ankle pumps  • Continue physical therapy  • Weightbearing as tolerated of the right lower extremity with assistance of a walker  • Incentive spirometry encouraged  • Pain control as clinically indicated  • Posterior hip precautions reviewed with patient  • Discharge planning – anticipated discharge is Home probable today
No

## 2019-11-20 ENCOUNTER — RECORD ABSTRACTING (OUTPATIENT)
Age: 74
End: 2019-11-20

## 2019-12-05 ENCOUNTER — APPOINTMENT (OUTPATIENT)
Dept: CARDIOLOGY | Facility: CLINIC | Age: 74
End: 2019-12-05
Payer: MEDICARE

## 2019-12-05 VITALS
HEART RATE: 56 BPM | SYSTOLIC BLOOD PRESSURE: 112 MMHG | BODY MASS INDEX: 30.21 KG/M2 | OXYGEN SATURATION: 95 % | WEIGHT: 204 LBS | HEIGHT: 69 IN | DIASTOLIC BLOOD PRESSURE: 74 MMHG

## 2019-12-05 PROCEDURE — 99213 OFFICE O/P EST LOW 20 MIN: CPT

## 2019-12-05 RX ORDER — CHOLECALCIFEROL (VITAMIN D3) 125 MCG
TABLET ORAL
Refills: 0 | Status: DISCONTINUED | COMMUNITY
End: 2019-12-05

## 2019-12-05 RX ORDER — MENTHOL/CAMPHOR 0.5 %-0.5%
1000 LOTION (ML) TOPICAL DAILY
Refills: 0 | Status: ACTIVE | COMMUNITY

## 2019-12-05 NOTE — REVIEW OF SYSTEMS
[see HPI] : see HPI [Joint Stiffness] : joint stiffness [Joint Pain] : joint pain [Negative] : Endocrine

## 2019-12-09 NOTE — HISTORY OF PRESENT ILLNESS
[FreeTextEntry1] : ZENOBIA KEENE  is a 74 year old  M\par History of VHD, dilated ao, arthritis, hypothyroidism, long-standing hypertension, and hyperlipidemia.\par \par There is no prior history of a clinical myocardial infarction, coronary revascularization. \par There is no history of symptomatic congestive heart failure rheumatic heart disease \par There is no history of symptomatic arrhythmias including atrial fibrillation.\par \par Overall he feels well\par Underwent hip surgery without a complication\par Back to usual exercise program\par Previously physically active and worked as a , engaged in contact sports. \par There is no exertional chest pain, pressure or discomfort. \par There is no significant dyspnea on exertion or orthopnea. \par There are no symptomatic palpitations, lightheadedness, dizziness or syncope.\par \par EKG demonstrates sinus bradycardia with possible LVH and left anterior fascicular block.\par Blood work, March 2019, creatinine 0.8, total cholesterol 167, LDL 77, hemoglobin 14.8\par Nuclear stress test 5/19 no ischemia\par Echocardiogram May 2019. Normal left ventricular function, ascending aorta, 4.2, mild to moderate aortic regurgitation

## 2019-12-09 NOTE — ASSESSMENT
[FreeTextEntry1] : HTN HL Dilated ao VHD\par Blood work has been requested. \par Continue beta blocker. Blood pressure control. \par Monitor aortic dilatation and valvular regurgitation\par Does not require SBE prophylaxis.

## 2019-12-09 NOTE — PHYSICAL EXAM
[General Appearance - Well Developed] : well developed [Normal Appearance] : normal appearance [Well Groomed] : well groomed [General Appearance - Well Nourished] : well nourished [No Deformities] : no deformities [General Appearance - In No Acute Distress] : no acute distress [Normal Conjunctiva] : the conjunctiva exhibited no abnormalities [Eyelids - No Xanthelasma] : the eyelids demonstrated no xanthelasmas [Normal Oral Mucosa] : normal oral mucosa [No Oral Pallor] : no oral pallor [No Oral Cyanosis] : no oral cyanosis [Normal Jugular Venous A Waves Present] : normal jugular venous A waves present [Normal Jugular Venous V Waves Present] : normal jugular venous V waves present [No Jugular Venous Deal A Waves] : no jugular venous deal A waves [Respiration, Rhythm And Depth] : normal respiratory rhythm and effort [Auscultation Breath Sounds / Voice Sounds] : lungs were clear to auscultation bilaterally [Exaggerated Use Of Accessory Muscles For Inspiration] : no accessory muscle use [Heart Rate And Rhythm] : heart rate and rhythm were normal [Heart Sounds] : normal S1 and S2 [Abdomen Soft] : soft [Murmurs] : no murmurs present [Abdomen Tenderness] : non-tender [Abdomen Mass (___ Cm)] : no abdominal mass palpated [Nail Clubbing] : no clubbing of the fingernails [Cyanosis, Localized] : no localized cyanosis [Petechial Hemorrhages (___cm)] : no petechial hemorrhages [Skin Color & Pigmentation] : normal skin color and pigmentation [] : no rash [No Venous Stasis] : no venous stasis [Skin Lesions] : no skin lesions [No Skin Ulcers] : no skin ulcer [No Xanthoma] : no  xanthoma was observed [Oriented To Time, Place, And Person] : oriented to person, place, and time [Affect] : the affect was normal [Mood] : the mood was normal [No Anxiety] : not feeling anxious [FreeTextEntry1] : difficulty ambulating

## 2020-04-13 ENCOUNTER — OUTPATIENT (OUTPATIENT)
Dept: OUTPATIENT SERVICES | Facility: HOSPITAL | Age: 75
LOS: 1 days | End: 2020-04-13

## 2020-04-13 DIAGNOSIS — Z96.651 PRESENCE OF RIGHT ARTIFICIAL KNEE JOINT: Chronic | ICD-10-CM

## 2020-04-13 DIAGNOSIS — Z98.890 OTHER SPECIFIED POSTPROCEDURAL STATES: Chronic | ICD-10-CM

## 2020-04-13 DIAGNOSIS — Z96.649 PRESENCE OF UNSPECIFIED ARTIFICIAL HIP JOINT: Chronic | ICD-10-CM

## 2020-05-20 NOTE — PRE-OP CHECKLIST - RESPIRATORY RATE (BREATHS/MIN)
Size Of Lesion In Cm: 0.8
Bill For Surgical Tray: no
Detail Level: Detailed
What Was Performed First?: Curettage
Bill As A Line Item Or As Units: Line Item
Render Post-Care Instructions In Note?: yes
Concentration (Mg/Ml Or Millions Of Plaque Forming Units/Cc): 0.01
Cautery Type: electrodesiccation
Total Volume (Ccs): 1
Consent was obtained from the patient. The risks, benefits and alternatives to therapy were discussed in detail. Specifically, the risks of infection, scarring, bleeding, prolonged wound healing, nerve injury, incomplete removal, allergy to anesthesia and recurrence were addressed. Alternatives to ED&C, such as: surgical removal and XRT were also discussed.  Prior to the procedure, the treatment site was clearly identified and confirmed by the patient. All components of Universal Protocol/PAUSE Rule completed.
Anesthesia Type: 1% lidocaine with epinephrine and 0.5% Marcaine
Size Of Lesion After Curettage: 1.2
Anesthesia Volume In Cc: 2.5
Post-Care Instructions: I reviewed with the patient in detail post-care instructions. Patient is to keep the area dry for 48 hours, and not to engage in any swimming until the area is healed. Should the patient develop any fevers, chills, bleeding, severe pain patient will contact the office immediately.
Number Of Curettages: 2
13

## 2020-06-26 ENCOUNTER — NON-APPOINTMENT (OUTPATIENT)
Age: 75
End: 2020-06-26

## 2020-06-26 ENCOUNTER — APPOINTMENT (OUTPATIENT)
Dept: CARDIOLOGY | Facility: CLINIC | Age: 75
End: 2020-06-26
Payer: MEDICARE

## 2020-06-26 VITALS — SYSTOLIC BLOOD PRESSURE: 132 MMHG | DIASTOLIC BLOOD PRESSURE: 92 MMHG | HEART RATE: 53 BPM | OXYGEN SATURATION: 95 %

## 2020-06-26 PROCEDURE — 99214 OFFICE O/P EST MOD 30 MIN: CPT

## 2020-06-26 PROCEDURE — 93000 ELECTROCARDIOGRAM COMPLETE: CPT

## 2020-06-26 NOTE — REASON FOR VISIT
[Abnormal ECG] : an abnormal ECG [Follow-Up - Clinic] : a clinic follow-up of [Hyperlipidemia] : hyperlipidemia [Medication Management] : Medication management [Hypertension] : hypertension

## 2020-06-26 NOTE — ASSESSMENT
[FreeTextEntry1] : HTN HL Dilated ao VHD\par Continue beta blocker. Blood pressure control. \par Increase statin\par Monitor aortic dilatation and valvular regurgitation\par Does not require SBE prophylaxis. \par

## 2020-06-26 NOTE — HISTORY OF PRESENT ILLNESS
[FreeTextEntry1] : ZENOBIA KEENE  is a 74 year old  M\par \par History of VHD, dilated ao, arthritis, hypothyroidism, long-standing hypertension, and hyperlipidemia.\par \par There is no prior history of a clinical myocardial infarction, coronary revascularization. \par There is no history of symptomatic congestive heart failure rheumatic heart disease \par There is no history of symptomatic arrhythmias including atrial fibrillation.\par \par Overall he feels well\par Back to usual exercise program\par Active at home gym: lightweights, aerobics\par There is no exertional chest pain, pressure or discomfort. \par There is no significant dyspnea on exertion or orthopnea. \par There are no symptomatic palpitations, lightheadedness, dizziness or syncope.\par \par Blood work April 2020 total cholesterol 179 LDL 74 creatinine 0.8 TSH 4.2 hemoglobin 14.1\par Nuclear stress test 5/19 no ischemia\par Echocardiogram May 2019. Normal left ventricular function, ascending aorta, 4.2, mild to moderate aortic regurgitation \par EKG SB Poss LVH NSST

## 2020-07-21 ENCOUNTER — APPOINTMENT (OUTPATIENT)
Dept: CARDIOLOGY | Facility: CLINIC | Age: 75
End: 2020-07-21
Payer: MEDICARE

## 2020-07-21 PROCEDURE — 93306 TTE W/DOPPLER COMPLETE: CPT

## 2020-07-21 PROCEDURE — 93979 VASCULAR STUDY: CPT

## 2020-07-21 PROCEDURE — 93880 EXTRACRANIAL BILAT STUDY: CPT

## 2020-08-28 ENCOUNTER — APPOINTMENT (OUTPATIENT)
Dept: CARDIOLOGY | Facility: CLINIC | Age: 75
End: 2020-08-28
Payer: MEDICARE

## 2020-08-28 VITALS
WEIGHT: 206 LBS | HEART RATE: 56 BPM | OXYGEN SATURATION: 95 % | SYSTOLIC BLOOD PRESSURE: 110 MMHG | BODY MASS INDEX: 30.51 KG/M2 | HEIGHT: 69 IN | DIASTOLIC BLOOD PRESSURE: 80 MMHG

## 2020-08-28 PROCEDURE — 99214 OFFICE O/P EST MOD 30 MIN: CPT

## 2020-08-28 NOTE — REASON FOR VISIT
[Follow-Up - Clinic] : a clinic follow-up of [Hypertension] : hypertension [Abnormal ECG] : an abnormal ECG [Hyperlipidemia] : hyperlipidemia [Medication Management] : Medication management

## 2020-08-29 NOTE — HISTORY OF PRESENT ILLNESS
[FreeTextEntry1] : ZENOBIA KEENE  is a 74 year old  M\par History of VHD, dilated ao, arthritis, hypothyroidism, long-standing hypertension, and hyperlipidemia.\par \par There is no prior history of a clinical myocardial infarction, coronary revascularization. \par There is no history of symptomatic congestive heart failure rheumatic heart disease \par There is no history of symptomatic arrhythmias including atrial fibrillation.\par \par Overall he feels well\par Back to usual exercise program\par Active at home gym: lightweights, aerobics\par There is no exertional chest pain, pressure or discomfort. \par There is no significant dyspnea on exertion or orthopnea. \par There are no symptomatic palpitations, lightheadedness, dizziness or syncope.\par \par July 2020 \par Abdominal ultrasound borderline aortic dimensions maximum 3.0 mild to moderate atherosclerosis \par Echocardiogram normal left ventricular function mild to moderate aortic regurgitation ascending aorta 4.3 cm \par Carotid Doppler demonstrates nonobstructive disease\par \par Blood work April 2020 total cholesterol 179 LDL 74 creatinine 0.8 TSH 4.2 hemoglobin 14.1\par Nuclear stress test 5/19 no ischemia\par Echocardiogram May 2019. Normal left ventricular function, ascending aorta, 4.2, mild to moderate aortic regurgitation \par EKG SB Poss LVH NSST\par \par \par

## 2020-08-29 NOTE — ASSESSMENT
[FreeTextEntry1] : HTN HL VHD\par Borderline abdominal aortic dimensions \par Aortic atherosclerosis \par Stable aortic dimensions and valvular heart disease\par Clinical follow-up in 1 year\par Upcoming blood work has been requested from primary physician\par Subclinical atherosclerosis. \par Continue beta blocker. Blood pressure control. \par Continue statin\par Monitor aortic dilatation and valvular regurgitation\par Does not require SBE prophylaxis.

## 2020-08-29 NOTE — PHYSICAL EXAM
[General Appearance - Well Developed] : well developed [General Appearance - Well Nourished] : well nourished [Well Groomed] : well groomed [Normal Appearance] : normal appearance [General Appearance - In No Acute Distress] : no acute distress [Normal Conjunctiva] : the conjunctiva exhibited no abnormalities [No Deformities] : no deformities [No Oral Pallor] : no oral pallor [Eyelids - No Xanthelasma] : the eyelids demonstrated no xanthelasmas [Normal Oral Mucosa] : normal oral mucosa [No Oral Cyanosis] : no oral cyanosis [Normal Jugular Venous V Waves Present] : normal jugular venous V waves present [Normal Jugular Venous A Waves Present] : normal jugular venous A waves present [No Jugular Venous Deal A Waves] : no jugular venous deal A waves [Respiration, Rhythm And Depth] : normal respiratory rhythm and effort [Exaggerated Use Of Accessory Muscles For Inspiration] : no accessory muscle use [Auscultation Breath Sounds / Voice Sounds] : lungs were clear to auscultation bilaterally [Heart Rate And Rhythm] : heart rate and rhythm were normal [Abdomen Tenderness] : non-tender [Abdomen Soft] : soft [Heart Sounds] : normal S1 and S2 [Murmurs] : no murmurs present [Abdomen Mass (___ Cm)] : no abdominal mass palpated [Petechial Hemorrhages (___cm)] : no petechial hemorrhages [Cyanosis, Localized] : no localized cyanosis [Nail Clubbing] : no clubbing of the fingernails [] : no rash [Skin Color & Pigmentation] : normal skin color and pigmentation [Skin Lesions] : no skin lesions [No Skin Ulcers] : no skin ulcer [No Venous Stasis] : no venous stasis [Oriented To Time, Place, And Person] : oriented to person, place, and time [No Xanthoma] : no  xanthoma was observed [Mood] : the mood was normal [No Anxiety] : not feeling anxious [Affect] : the affect was normal [FreeTextEntry1] : difficulty ambulating

## 2020-09-28 ENCOUNTER — OUTPATIENT (OUTPATIENT)
Dept: OUTPATIENT SERVICES | Facility: HOSPITAL | Age: 75
LOS: 1 days | End: 2020-09-28

## 2020-09-28 DIAGNOSIS — Z96.651 PRESENCE OF RIGHT ARTIFICIAL KNEE JOINT: Chronic | ICD-10-CM

## 2020-09-28 DIAGNOSIS — Z98.890 OTHER SPECIFIED POSTPROCEDURAL STATES: Chronic | ICD-10-CM

## 2020-09-28 DIAGNOSIS — Z96.649 PRESENCE OF UNSPECIFIED ARTIFICIAL HIP JOINT: Chronic | ICD-10-CM

## 2020-12-03 ENCOUNTER — OUTPATIENT (OUTPATIENT)
Dept: OUTPATIENT SERVICES | Facility: HOSPITAL | Age: 75
LOS: 1 days | End: 2020-12-03

## 2020-12-03 DIAGNOSIS — Z98.890 OTHER SPECIFIED POSTPROCEDURAL STATES: Chronic | ICD-10-CM

## 2020-12-03 DIAGNOSIS — Z96.651 PRESENCE OF RIGHT ARTIFICIAL KNEE JOINT: Chronic | ICD-10-CM

## 2020-12-03 DIAGNOSIS — Z96.649 PRESENCE OF UNSPECIFIED ARTIFICIAL HIP JOINT: Chronic | ICD-10-CM

## 2021-01-31 NOTE — PHYSICAL EXAM
[General Appearance - Well Developed] : well developed [Well Groomed] : well groomed [Normal Appearance] : normal appearance [General Appearance - Well Nourished] : well nourished [General Appearance - In No Acute Distress] : no acute distress [No Deformities] : no deformities [Normal Conjunctiva] : the conjunctiva exhibited no abnormalities [Eyelids - No Xanthelasma] : the eyelids demonstrated no xanthelasmas [Normal Oral Mucosa] : normal oral mucosa ambulatory [No Oral Pallor] : no oral pallor [No Oral Cyanosis] : no oral cyanosis [Normal Jugular Venous V Waves Present] : normal jugular venous V waves present [Normal Jugular Venous A Waves Present] : normal jugular venous A waves present [No Jugular Venous Deal A Waves] : no jugular venous deal A waves [Respiration, Rhythm And Depth] : normal respiratory rhythm and effort [Exaggerated Use Of Accessory Muscles For Inspiration] : no accessory muscle use [Auscultation Breath Sounds / Voice Sounds] : lungs were clear to auscultation bilaterally [Heart Rate And Rhythm] : heart rate and rhythm were normal [Murmurs] : no murmurs present [Heart Sounds] : normal S1 and S2 [Abdomen Tenderness] : non-tender [Abdomen Soft] : soft [Abdomen Mass (___ Cm)] : no abdominal mass palpated [Nail Clubbing] : no clubbing of the fingernails [Petechial Hemorrhages (___cm)] : no petechial hemorrhages [Cyanosis, Localized] : no localized cyanosis [] : no rash [Skin Color & Pigmentation] : normal skin color and pigmentation [No Venous Stasis] : no venous stasis [No Skin Ulcers] : no skin ulcer [No Xanthoma] : no  xanthoma was observed [Skin Lesions] : no skin lesions [Oriented To Time, Place, And Person] : oriented to person, place, and time [Mood] : the mood was normal [Affect] : the affect was normal [No Anxiety] : not feeling anxious [FreeTextEntry1] : difficulty ambulating Performed

## 2021-04-30 NOTE — PATIENT PROFILE ADULT - NSASFUNCLEVELADLAMBULATE_GEN_A_NUR
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  He was initially seen 2/16/2021.  He reported a 1 year history of constant epigastric pain that waxed and waned in severity.  He had previously undergone a diagnostic evaluation including labs, CT, and EGD.  He had also had an MRCP to evaluate hyperbilirubinemia.  No organic findings were discovered on evaluation.  There is been no significant improvement with pantoprazole.  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  He was prescribed amitriptyline as a nonnarcotic pain modulator. Amitriptyline has been of no benefit.  He continues to have pain indicated in epigastrium when his stomach is empty.  This pain is not exacerbated by eating.  When he eats, he feels as though his food is "not digesting."  He has frequent belching and feels bloated.  He has intermittent nausea associated with his symptoms.  He denies heartburn, dysphagia, red blood per rectum, diarrhea, or constipation.  He is wondering if he needs another EGD.  3 = assistive equipment and person

## 2021-07-09 ENCOUNTER — APPOINTMENT (OUTPATIENT)
Dept: OPHTHALMOLOGY | Facility: CLINIC | Age: 76
End: 2021-07-09

## 2021-08-13 ENCOUNTER — NON-APPOINTMENT (OUTPATIENT)
Age: 76
End: 2021-08-13

## 2021-08-13 ENCOUNTER — APPOINTMENT (OUTPATIENT)
Dept: CARDIOLOGY | Facility: CLINIC | Age: 76
End: 2021-08-13
Payer: MEDICARE

## 2021-08-13 VITALS
HEIGHT: 69 IN | DIASTOLIC BLOOD PRESSURE: 76 MMHG | OXYGEN SATURATION: 98 % | SYSTOLIC BLOOD PRESSURE: 130 MMHG | HEART RATE: 59 BPM | WEIGHT: 211 LBS | BODY MASS INDEX: 31.25 KG/M2 | TEMPERATURE: 98 F

## 2021-08-13 PROCEDURE — 99214 OFFICE O/P EST MOD 30 MIN: CPT

## 2021-08-13 PROCEDURE — 93000 ELECTROCARDIOGRAM COMPLETE: CPT

## 2021-08-13 NOTE — REASON FOR VISIT
[Carotid, Aortic and Peripheral Vascular Disease] : carotid, aortic and peripheral vascular disease [Hyperlipidemia] : hyperlipidemia [Hypertension] : hypertension

## 2021-08-16 ENCOUNTER — APPOINTMENT (OUTPATIENT)
Dept: OPHTHALMOLOGY | Facility: CLINIC | Age: 76
End: 2021-08-16
Payer: MEDICARE

## 2021-08-16 ENCOUNTER — NON-APPOINTMENT (OUTPATIENT)
Age: 76
End: 2021-08-16

## 2021-08-16 PROCEDURE — 92014 COMPRE OPH EXAM EST PT 1/>: CPT

## 2021-08-22 NOTE — HISTORY OF PRESENT ILLNESS
[FreeTextEntry1] : ZENOBIA KEENE  is a 75 year old  M\par \par \par \par History of VHD, dilated ao, arthritis, hypothyroidism, long-standing hypertension, and hyperlipidemia.\par There is no prior history of a clinical myocardial infarction, coronary revascularization. \par There is no history of symptomatic congestive heart failure rheumatic heart disease \par There is no history of symptomatic arrhythmias including atrial fibrillation.\par \par Overall he feels well\par Back to usual exercise program\par Active at home gym: lightweights, aerobics\par There is no exertional chest pain, pressure or discomfort. \par There is no significant dyspnea on exertion or orthopnea. \par There are no symptomatic palpitations, lightheadedness, dizziness or syncope.\par endorses lifestyle indiscretion\par \par recent labs total cholesterol 156 triglycerides 232 LDL 78 glucose 113 creatinine 0.7 potassium 4.4 \par EKG demonstrates sinus bradycardia with left anterior fascicular block left ventricular hypertrophy poor R wave progression \par \par July 2020 \par Abdominal ultrasound borderline aortic dimensions maximum 3.0 mild to moderate atherosclerosis \par Echocardiogram normal left ventricular function mild to moderate aortic regurgitation ascending aorta 4.3 cm \par Carotid Doppler demonstrates nonobstructive disease\par \par Nuclear stress test 5/19 no ischemia

## 2021-08-22 NOTE — ASSESSMENT
[FreeTextEntry1] : \par \par baseline abnormal EKG \par hypertensive heart disease \par follow-up blood work \par discussed augmentation of medical therapy if persistent metabolic abnormalities\par Abdominal ultrasound and echocardiogram \par refilled statin therapy \par continue antihypertensives including beta-blocker therapy \par \par HTN HL VHD\par Borderline abdominal aortic dimensions \par Aortic atherosclerosis \par Continue beta blocker. Blood pressure control. \par Continue statin\par Monitor aortic dilatation and valvular regurgitation\par Does not require SBE prophylaxis. \par \par \par \par

## 2021-08-27 ENCOUNTER — APPOINTMENT (OUTPATIENT)
Dept: OPHTHALMOLOGY | Facility: CLINIC | Age: 76
End: 2021-08-27
Payer: MEDICARE

## 2021-08-27 ENCOUNTER — NON-APPOINTMENT (OUTPATIENT)
Age: 76
End: 2021-08-27

## 2021-08-27 PROCEDURE — 92014 COMPRE OPH EXAM EST PT 1/>: CPT

## 2021-08-27 PROCEDURE — 92136 OPHTHALMIC BIOMETRY: CPT

## 2021-09-02 DIAGNOSIS — Z01.818 ENCOUNTER FOR OTHER PREPROCEDURAL EXAMINATION: ICD-10-CM

## 2021-09-05 ENCOUNTER — APPOINTMENT (OUTPATIENT)
Dept: DISASTER EMERGENCY | Facility: CLINIC | Age: 76
End: 2021-09-05

## 2021-09-07 LAB — SARS-COV-2 N GENE NPH QL NAA+PROBE: NOT DETECTED

## 2021-09-08 ENCOUNTER — OUTPATIENT (OUTPATIENT)
Dept: OUTPATIENT SERVICES | Facility: HOSPITAL | Age: 76
LOS: 1 days | End: 2021-09-08

## 2021-09-08 ENCOUNTER — NON-APPOINTMENT (OUTPATIENT)
Age: 76
End: 2021-09-08

## 2021-09-08 ENCOUNTER — APPOINTMENT (OUTPATIENT)
Dept: OPHTHALMOLOGY | Facility: HOSPITAL | Age: 76
End: 2021-09-08
Payer: MEDICARE

## 2021-09-08 DIAGNOSIS — Z98.890 OTHER SPECIFIED POSTPROCEDURAL STATES: Chronic | ICD-10-CM

## 2021-09-08 DIAGNOSIS — Z96.649 PRESENCE OF UNSPECIFIED ARTIFICIAL HIP JOINT: Chronic | ICD-10-CM

## 2021-09-08 DIAGNOSIS — Z96.651 PRESENCE OF RIGHT ARTIFICIAL KNEE JOINT: Chronic | ICD-10-CM

## 2021-09-08 PROCEDURE — 66984 XCAPSL CTRC RMVL W/O ECP: CPT | Mod: RT

## 2021-09-09 ENCOUNTER — NON-APPOINTMENT (OUTPATIENT)
Age: 76
End: 2021-09-09

## 2021-09-09 ENCOUNTER — APPOINTMENT (OUTPATIENT)
Dept: OPHTHALMOLOGY | Facility: CLINIC | Age: 76
End: 2021-09-09
Payer: MEDICARE

## 2021-09-09 PROCEDURE — 99024 POSTOP FOLLOW-UP VISIT: CPT

## 2021-09-17 ENCOUNTER — NON-APPOINTMENT (OUTPATIENT)
Age: 76
End: 2021-09-17

## 2021-09-17 ENCOUNTER — APPOINTMENT (OUTPATIENT)
Dept: OPHTHALMOLOGY | Facility: CLINIC | Age: 76
End: 2021-09-17
Payer: MEDICARE

## 2021-09-17 PROCEDURE — 99024 POSTOP FOLLOW-UP VISIT: CPT

## 2021-10-08 ENCOUNTER — NON-APPOINTMENT (OUTPATIENT)
Age: 76
End: 2021-10-08

## 2021-10-08 ENCOUNTER — APPOINTMENT (OUTPATIENT)
Dept: OPHTHALMOLOGY | Facility: CLINIC | Age: 76
End: 2021-10-08
Payer: MEDICARE

## 2021-10-08 PROCEDURE — 99024 POSTOP FOLLOW-UP VISIT: CPT

## 2021-10-08 PROCEDURE — 92015 DETERMINE REFRACTIVE STATE: CPT

## 2021-10-12 ENCOUNTER — APPOINTMENT (OUTPATIENT)
Dept: CARDIOLOGY | Facility: CLINIC | Age: 76
End: 2021-10-12
Payer: MEDICARE

## 2021-10-12 PROCEDURE — 93306 TTE W/DOPPLER COMPLETE: CPT

## 2021-10-12 PROCEDURE — 93979 VASCULAR STUDY: CPT

## 2021-10-15 ENCOUNTER — APPOINTMENT (OUTPATIENT)
Dept: CARDIOLOGY | Facility: CLINIC | Age: 76
End: 2021-10-15
Payer: MEDICARE

## 2021-10-15 VITALS
DIASTOLIC BLOOD PRESSURE: 70 MMHG | HEIGHT: 69 IN | WEIGHT: 218 LBS | TEMPERATURE: 97.1 F | BODY MASS INDEX: 32.29 KG/M2 | SYSTOLIC BLOOD PRESSURE: 128 MMHG | OXYGEN SATURATION: 96 % | HEART RATE: 59 BPM

## 2021-10-15 PROCEDURE — 99214 OFFICE O/P EST MOD 30 MIN: CPT

## 2021-10-15 NOTE — REASON FOR VISIT
[Hyperlipidemia] : hyperlipidemia [Hypertension] : hypertension [Carotid, Aortic and Peripheral Vascular Disease] : carotid, aortic and peripheral vascular disease

## 2021-10-15 NOTE — ASSESSMENT
[FreeTextEntry1] : ZENOBIA KEENE is a 75 year old M who presents today Oct 15, 2021 here to review cardiovascular test results. \par \par Baseline abnormal EKG.\par Normal EF. No angina. Last stress 2019 no ischemia. \par Risk factor modification and medical therapy. \par \par Hypertensive heart disease \par Currently remains well controlled. \par Cont bisoprolol/hctz. Renal function and electrolytes are stable per recent labs. \par Consider ARB if elevated, continue to monitor. \par \par TAA\par Borderline abdominal aortic dimensions \par Aortic atherosclerosis \par Reviewed US testing, slight enlargement of thoracic and abd ao. \par Surveillance monitoring to be continued. Emphasized BP control. \par If further enlargement on followup US imaging will consider CT. \par \par Continue beta blocker. Blood pressure control. \par Continue statin\par \par HLD, lipids well controlled. Adjunctive diet and lifestyle modification measures reviewed. \par \par Any questions and concerns were addressed and resolved. \par \par Sincerely,\par \par ISATU Thomas\par Patients history, testing, and plan reviewed with supervising MD: Dr. Silvestre Cedeño \par \par

## 2021-10-15 NOTE — ADDENDUM
[FreeTextEntry1] : Please note the patient was reviewed with NP Madison Sanford.\speedy I was physically present during the service of the patient.\speedy I was directly involved in the management plan and recommendations of the care provided to the patient. \speedy I personally reviewed the history and physical examination as documented by the NP above.\par Oct 15 2021 10:00AM\par

## 2021-10-15 NOTE — HISTORY OF PRESENT ILLNESS
[FreeTextEntry1] : ZENOBIA KEENE  is a 75 year old  M here to review cardiovascular test results. \par \par History of VHD, dilated ao, arthritis, hypothyroidism, long-standing hypertension, and hyperlipidemia.\par \par There is no prior history of a clinical myocardial infarction, coronary revascularization. \par There is no history of symptomatic congestive heart failure rheumatic heart disease \par There is no history of symptomatic arrhythmias including atrial fibrillation.\par \par Overall he feels well\par Less exercise than usual\par Active at home gym: lightweights, aerobics\par There is no exertional chest pain, pressure or discomfort. \par There is no significant dyspnea on exertion or orthopnea. \par There are no symptomatic palpitations, lightheadedness, dizziness or syncope.\par endorses lifestyle indiscretion\par \par Echo 10/12/21 normal LV systolic function, EF 60%, mild MR, mild to mod AI, normal chamber dimensions\par Ao root 4.3cm, asc ao 4.5cm, arch 3.8cm (slight increase from prior) \par Abd ao 3.1cm, mild to mod atherosclerosis \par \par recent labs 8/3/21 total cholesterol 156 triglycerides 232 LDL 78 glucose 113 creatinine 0.7 potassium 4.4 \par EKG 8/13/21 demonstrates sinus bradycardia with left anterior fascicular block left ventricular hypertrophy poor R wave progression \par \par July 2020 \par Abdominal ultrasound borderline aortic dimensions maximum 3.0 mild to moderate atherosclerosis \par Echocardiogram normal left ventricular function mild to moderate aortic regurgitation ascending aorta 4.3 cm \par Carotid Doppler demonstrates nonobstructive disease\par \par Nuclear stress test 5/19 no ischemia

## 2021-10-28 NOTE — REASON FOR VISIT
Date of Service: 10/28/2021    PREOPERATIVE DIAGNOSIS:  Right renal mass.    POSTOPERATIVE DIAGNOSIS:  Right renal mass.    PROCEDURE PERFORMED:  Robotic-assisted laparoscopic right radical nephrectomy.    SURGEON:  Grey Dobbins MD.    ASSISTANT:  Umair Riddle III, MD.   Gus Cárdenas SA.    ANESTHESIA:  General.    COMPLICATIONS:  None.    DRAINS:  None.    SPECIMENS:  Right kidney for permanent.    BLOOD LOSS:  25 mL.    FINDINGS:  Successful right radical nephrectomy notable only for perinephric edema.    INDICATIONS FOR SURGERY:  Mr. Bunny Domínguez is a 48-year-old gentleman with multiple medical comorbidities.  He was discovered after an episode of gross hematuria to have 2 large enhancing renal masses with central invasion.  He is a longstanding dialysis patient who is anuric and it was recommended that he undergo radical nephrectomy given the suspicion for locally invasive renal cell carcinoma.  The patient was agreeable to proceed.    OPERATIVE PROCEDURE:  The patient was met in the preoperative holding area and his right-hand side was marked.  He was brought to the operating room suite where general anesthesia was induced.  He required a right radial arterial line as well as an internal jugular central catheter.  He required pressor support upon induction, details of this can be found in the anesthesia documentation.  After he was stabilized, he was positioned in right side up flank position.  All pressure points were padded with particular attention paid to his foot wound as well as his ankle bracelet to ensure that there was no pressure necrosis.  He was then prepped and draped in standard fashion.  An operative timeout was then performed.    I began the operation by obtaining intraabdominal access off of the tip of the 12th rib at the lateral rectus border.  I used a Veress needle and, after a negative drop test, successfully insufflated the abdomen.  I then inserted a 5 mm port under direct  vision with the Visiport and popped into the peritoneum.  I then inspected the abdomen.  It was free of any intra-abdominal adhesions or injuries.  Ports were then placed in standard position.  The robot was then docked.      I began by mobilizing the liver and holding this upwards with the grasping forceps.  I then dropped the bowel medially along the white line of Toldt.  The gonadal vein was then isolated, dropped medially and the ureter was brought out laterally.  This was then dissected towards the renal hilum until the artery and the vein were exposed.  These were then stapled en bloc using an Endo-MAK stapler using a vascular load.  The upper pole attachments and lateral attachments were then mobilized.  The lower pole of Gerota's was then taken.  Specimen was then bagged.  The abdomen was inspected.  There was no active bleeding.  We did use surgical SNoW throughout the dissection field, with particular attention to the upper pole and hilum given that he is going to require immediate anticoagulation.  The 12 port was then closed with an 0 Vicryl suture.  We then extracted off of the supraumbilical incision, this was extended laterally.  The rectus muscle was reflected medially.  The peritoneum was entered, and the specimen was removed.  The peritoneum was closed with an 0 Vicryl suture.  The fascia was closed with an 0 PDS suture.  The subcutaneous tissue was then closed with 3-0 Vicryl and skin was closed with a Monocryl suture.  Surgical glue was applied.  The patient was then awoken from anesthesia and transferred to the PACU in guarded condition.    PLAN:  The patient will be recovered as an inpatient.  We will keep him under direct observation while he is re-anticoagulated.  He will then follow up for postoperative recommendations based off of his final pathology.        Dictated By: Grey Dobbins MD  Signing Provider: Grey Dobbins MD JR/pietro ([id number])  DD: 10/28/2021 15:44:29 TD: 10/28/2021  16:35:34    Copy Sent To:    [Follow-Up - Clinic] : a clinic follow-up of [Abnormal ECG] : an abnormal ECG [Hyperlipidemia] : hyperlipidemia [Hypertension] : hypertension [Medication Management] : Medication management

## 2022-02-10 ENCOUNTER — APPOINTMENT (OUTPATIENT)
Dept: MRI IMAGING | Facility: CLINIC | Age: 77
End: 2022-02-10
Payer: MEDICARE

## 2022-02-10 PROCEDURE — 73721 MRI JNT OF LWR EXTRE W/O DYE: CPT | Mod: RT,MH

## 2022-03-25 ENCOUNTER — NON-APPOINTMENT (OUTPATIENT)
Age: 77
End: 2022-03-25

## 2022-03-25 ENCOUNTER — APPOINTMENT (OUTPATIENT)
Dept: OPHTHALMOLOGY | Facility: CLINIC | Age: 77
End: 2022-03-25
Payer: MEDICARE

## 2022-03-25 PROCEDURE — 92014 COMPRE OPH EXAM EST PT 1/>: CPT

## 2022-03-25 PROCEDURE — 92136 OPHTHALMIC BIOMETRY: CPT

## 2022-04-08 ENCOUNTER — APPOINTMENT (OUTPATIENT)
Dept: OPHTHALMOLOGY | Facility: CLINIC | Age: 77
End: 2022-04-08

## 2022-04-09 ENCOUNTER — NON-APPOINTMENT (OUTPATIENT)
Age: 77
End: 2022-04-09

## 2022-04-13 ENCOUNTER — NON-APPOINTMENT (OUTPATIENT)
Age: 77
End: 2022-04-13

## 2022-04-13 ENCOUNTER — APPOINTMENT (OUTPATIENT)
Dept: OPHTHALMOLOGY | Facility: HOSPITAL | Age: 77
End: 2022-04-13
Payer: MEDICARE

## 2022-04-13 ENCOUNTER — OUTPATIENT (OUTPATIENT)
Dept: OUTPATIENT SERVICES | Facility: HOSPITAL | Age: 77
LOS: 1 days | End: 2022-04-13
Payer: COMMERCIAL

## 2022-04-13 DIAGNOSIS — Z96.651 PRESENCE OF RIGHT ARTIFICIAL KNEE JOINT: Chronic | ICD-10-CM

## 2022-04-13 DIAGNOSIS — Z98.890 OTHER SPECIFIED POSTPROCEDURAL STATES: Chronic | ICD-10-CM

## 2022-04-13 DIAGNOSIS — Z96.649 PRESENCE OF UNSPECIFIED ARTIFICIAL HIP JOINT: Chronic | ICD-10-CM

## 2022-04-13 PROCEDURE — 66984 XCAPSL CTRC RMVL W/O ECP: CPT | Mod: LT

## 2022-04-14 ENCOUNTER — NON-APPOINTMENT (OUTPATIENT)
Age: 77
End: 2022-04-14

## 2022-04-14 ENCOUNTER — APPOINTMENT (OUTPATIENT)
Dept: OPHTHALMOLOGY | Facility: CLINIC | Age: 77
End: 2022-04-14
Payer: MEDICARE

## 2022-04-14 PROCEDURE — 99024 POSTOP FOLLOW-UP VISIT: CPT

## 2022-04-15 DIAGNOSIS — H25.12 AGE-RELATED NUCLEAR CATARACT, LEFT EYE: ICD-10-CM

## 2022-04-22 ENCOUNTER — APPOINTMENT (OUTPATIENT)
Dept: OPHTHALMOLOGY | Facility: CLINIC | Age: 77
End: 2022-04-22
Payer: MEDICARE

## 2022-04-22 ENCOUNTER — NON-APPOINTMENT (OUTPATIENT)
Age: 77
End: 2022-04-22

## 2022-04-22 PROCEDURE — 99024 POSTOP FOLLOW-UP VISIT: CPT

## 2022-05-16 ENCOUNTER — NON-APPOINTMENT (OUTPATIENT)
Age: 77
End: 2022-05-16

## 2022-05-16 ENCOUNTER — APPOINTMENT (OUTPATIENT)
Dept: OPHTHALMOLOGY | Facility: CLINIC | Age: 77
End: 2022-05-16
Payer: MEDICARE

## 2022-05-16 PROCEDURE — 99024 POSTOP FOLLOW-UP VISIT: CPT

## 2022-06-06 ENCOUNTER — APPOINTMENT (OUTPATIENT)
Dept: OPHTHALMOLOGY | Facility: CLINIC | Age: 77
End: 2022-06-06
Payer: MEDICARE

## 2022-06-06 ENCOUNTER — NON-APPOINTMENT (OUTPATIENT)
Age: 77
End: 2022-06-06

## 2022-06-06 PROCEDURE — 66821 AFTER CATARACT LASER SURGERY: CPT | Mod: 79,LT

## 2022-07-08 ENCOUNTER — APPOINTMENT (OUTPATIENT)
Dept: OPHTHALMOLOGY | Facility: CLINIC | Age: 77
End: 2022-07-08

## 2022-07-08 ENCOUNTER — NON-APPOINTMENT (OUTPATIENT)
Age: 77
End: 2022-07-08

## 2022-07-08 PROCEDURE — 92015 DETERMINE REFRACTIVE STATE: CPT

## 2022-07-08 PROCEDURE — 99024 POSTOP FOLLOW-UP VISIT: CPT

## 2022-10-03 ENCOUNTER — RX RENEWAL (OUTPATIENT)
Age: 77
End: 2022-10-03

## 2022-10-24 ENCOUNTER — NON-APPOINTMENT (OUTPATIENT)
Age: 77
End: 2022-10-24

## 2022-10-25 ENCOUNTER — APPOINTMENT (OUTPATIENT)
Dept: UROLOGY | Facility: CLINIC | Age: 77
End: 2022-10-25

## 2022-10-25 VITALS
WEIGHT: 218 LBS | DIASTOLIC BLOOD PRESSURE: 72 MMHG | TEMPERATURE: 97.2 F | HEIGHT: 69 IN | HEART RATE: 52 BPM | BODY MASS INDEX: 32.29 KG/M2 | SYSTOLIC BLOOD PRESSURE: 143 MMHG

## 2022-10-25 DIAGNOSIS — N40.1 BENIGN PROSTATIC HYPERPLASIA WITH LOWER URINARY TRACT SYMPMS: ICD-10-CM

## 2022-10-25 DIAGNOSIS — R35.1 BENIGN PROSTATIC HYPERPLASIA WITH LOWER URINARY TRACT SYMPMS: ICD-10-CM

## 2022-10-25 PROCEDURE — 99204 OFFICE O/P NEW MOD 45 MIN: CPT

## 2022-10-25 NOTE — HISTORY OF PRESENT ILLNESS
[FreeTextEntry1] : 76yo M referred for Elevated PSA level\par He denies any bothersome urinary symptoms. Nocturia 1-2x\par \par PSA 4.02 on 9/2022\par PSA 3.86 on 4/2020\par \par +family hx of prostate CA in father

## 2022-10-25 NOTE — PHYSICAL EXAM
[General Appearance - Well Developed] : well developed [General Appearance - Well Nourished] : well nourished [Normal Appearance] : normal appearance [Well Groomed] : well groomed [General Appearance - In No Acute Distress] : no acute distress [Edema] : no peripheral edema [Respiration, Rhythm And Depth] : normal respiratory rhythm and effort [Exaggerated Use Of Accessory Muscles For Inspiration] : no accessory muscle use [Abdomen Soft] : soft [Urethral Meatus] : meatus normal [Penis Abnormality] : normal circumcised penis [Scrotum] : the scrotum was normal [Testes Tenderness] : no tenderness of the testes [Testes Mass (___cm)] : there were no testicular masses [Prostate Tenderness] : the prostate was not tender [No Prostate Nodules] : no prostate nodules [Prostate Size ___ gm] : prostate size [unfilled] gm [Normal Station and Gait] : the gait and station were normal for the patient's age [] : no rash [No Focal Deficits] : no focal deficits [Oriented To Time, Place, And Person] : oriented to person, place, and time [FreeTextEntry1] : right epididymal cyst

## 2022-10-25 NOTE — ASSESSMENT
[FreeTextEntry1] : Elevated PSA:\par Discussed with patient that PSA is imprecise test. PSA can be elevated due to benign prostate enlargement, prostate stimulation, sexual activity, urinary tract infection, prostatitis, as well as prostate cancer. There is no value for PSA which is diagnostic for prostate cancer, nor is there value which conclusively excludes prostate cancer. PSA simply stratifies risk for prostate cancer and diagnosis of prostate cancer requires prostate biopsy.\par discussed repeating PSA vs obtaining MRI \par Pt wants to proceed with MRI for possible MRI fusion biopsy\par f/u 2 weeks\par \par BPH: \par nocturia not bothersome\par check urine culture

## 2022-10-25 NOTE — LETTER BODY
[Dear  ___] : Dear  [unfilled], [Consult Letter:] : I had the pleasure of evaluating your patient, [unfilled]. [Please see my note below.] : Please see my note below. [Consult Closing:] : Thank you very much for allowing me to participate in the care of this patient.  If you have any questions, please do not hesitate to contact me. [Sincerely,] : Sincerely, [FreeTextEntry3] : Sushil Fuller, \par Genitourinary Medicine\par [facility]\par

## 2022-10-27 LAB — BACTERIA UR CULT: NORMAL

## 2022-10-31 ENCOUNTER — RESULT REVIEW (OUTPATIENT)
Age: 77
End: 2022-10-31

## 2022-10-31 ENCOUNTER — APPOINTMENT (OUTPATIENT)
Dept: MRI IMAGING | Facility: CLINIC | Age: 77
End: 2022-10-31

## 2022-10-31 ENCOUNTER — OUTPATIENT (OUTPATIENT)
Dept: OUTPATIENT SERVICES | Facility: HOSPITAL | Age: 77
LOS: 1 days | End: 2022-10-31
Payer: MEDICARE

## 2022-10-31 DIAGNOSIS — R97.20 ELEVATED PROSTATE SPECIFIC ANTIGEN [PSA]: ICD-10-CM

## 2022-10-31 DIAGNOSIS — Z96.649 PRESENCE OF UNSPECIFIED ARTIFICIAL HIP JOINT: Chronic | ICD-10-CM

## 2022-10-31 DIAGNOSIS — Z96.651 PRESENCE OF RIGHT ARTIFICIAL KNEE JOINT: Chronic | ICD-10-CM

## 2022-10-31 DIAGNOSIS — Z98.890 OTHER SPECIFIED POSTPROCEDURAL STATES: Chronic | ICD-10-CM

## 2022-10-31 PROCEDURE — 72197 MRI PELVIS W/O & W/DYE: CPT

## 2022-10-31 PROCEDURE — A9585: CPT

## 2022-10-31 PROCEDURE — 72197 MRI PELVIS W/O & W/DYE: CPT | Mod: 26,MH

## 2022-10-31 PROCEDURE — 76498P: CUSTOM | Mod: 26,MH

## 2022-10-31 PROCEDURE — 76498 UNLISTED MR PROCEDURE: CPT

## 2022-11-04 ENCOUNTER — APPOINTMENT (OUTPATIENT)
Dept: MRI IMAGING | Facility: CLINIC | Age: 77
End: 2022-11-04

## 2022-11-08 ENCOUNTER — APPOINTMENT (OUTPATIENT)
Dept: UROLOGY | Facility: CLINIC | Age: 77
End: 2022-11-08

## 2022-11-08 VITALS
BODY MASS INDEX: 32.29 KG/M2 | TEMPERATURE: 97.4 F | HEIGHT: 69 IN | WEIGHT: 218 LBS | DIASTOLIC BLOOD PRESSURE: 69 MMHG | HEART RATE: 51 BPM | SYSTOLIC BLOOD PRESSURE: 138 MMHG

## 2022-11-08 PROCEDURE — 99213 OFFICE O/P EST LOW 20 MIN: CPT

## 2022-11-08 NOTE — ASSESSMENT
[FreeTextEntry1] : Elevated PSA:\par MRI shows two PIRADS 4 lesions. \par Discussed transperineal prostate biopsy \par will set up with Dr. Beltrán for biopsy - discuss anesthesia local vs general\par \par BPH: \par volume 41cc\par nocturia 1x not bothersome\par

## 2022-11-08 NOTE — PHYSICAL EXAM
[General Appearance - Well Developed] : well developed [General Appearance - Well Nourished] : well nourished [Normal Appearance] : normal appearance [Well Groomed] : well groomed [General Appearance - In No Acute Distress] : no acute distress [Abdomen Soft] : soft [Prostate Tenderness] : the prostate was not tender [No Prostate Nodules] : no prostate nodules [Prostate Size ___ gm] : prostate size [unfilled] gm [Edema] : no peripheral edema [] : no respiratory distress [Respiration, Rhythm And Depth] : normal respiratory rhythm and effort [Exaggerated Use Of Accessory Muscles For Inspiration] : no accessory muscle use [Oriented To Time, Place, And Person] : oriented to person, place, and time [Normal Station and Gait] : the gait and station were normal for the patient's age [No Focal Deficits] : no focal deficits

## 2022-11-08 NOTE — LETTER BODY
[Dear  ___] : Dear  [unfilled], [Consult Letter:] : I had the pleasure of evaluating your patient, [unfilled]. [Please see my note below.] : Please see my note below. [Consult Closing:] : Thank you very much for allowing me to participate in the care of this patient.  If you have any questions, please do not hesitate to contact me. [Sincerely,] : Sincerely, [FreeTextEntry3] : Sushil Fuller, \par Genitourinary Medicine\par [facility]\par

## 2022-11-08 NOTE — HISTORY OF PRESENT ILLNESS
[FreeTextEntry1] : 78yo M referred for Elevated PSA level\par MRI performed showed 2 lesions PIRADS 4. \par \par \par PSA 4.02 on 9/2022\par PSA 3.86 on 4/2020\par \par +family hx of prostate CA in father

## 2022-11-16 ENCOUNTER — APPOINTMENT (OUTPATIENT)
Dept: UROLOGY | Facility: CLINIC | Age: 77
End: 2022-11-16

## 2022-11-16 VITALS
OXYGEN SATURATION: 97 % | TEMPERATURE: 96.4 F | BODY MASS INDEX: 31.1 KG/M2 | HEIGHT: 69 IN | DIASTOLIC BLOOD PRESSURE: 76 MMHG | SYSTOLIC BLOOD PRESSURE: 133 MMHG | WEIGHT: 210 LBS | HEART RATE: 53 BPM

## 2022-11-16 DIAGNOSIS — R97.20 ELEVATED PROSTATE, SPECIFIC ANTIGEN [PSA]: ICD-10-CM

## 2022-11-16 PROCEDURE — 99214 OFFICE O/P EST MOD 30 MIN: CPT

## 2022-11-16 NOTE — PHYSICAL EXAM
[General Appearance - Well Developed] : well developed [General Appearance - Well Nourished] : well nourished [Normal Appearance] : normal appearance [Well Groomed] : well groomed [General Appearance - In No Acute Distress] : no acute distress [Abdomen Soft] : soft [Prostate Tenderness] : the prostate was not tender [No Prostate Nodules] : no prostate nodules [Prostate Size ___ gm] : prostate size [unfilled] gm [] : no respiratory distress [Edema] : no peripheral edema [Respiration, Rhythm And Depth] : normal respiratory rhythm and effort [Exaggerated Use Of Accessory Muscles For Inspiration] : no accessory muscle use [Oriented To Time, Place, And Person] : oriented to person, place, and time [Normal Station and Gait] : the gait and station were normal for the patient's age [No Focal Deficits] : no focal deficits

## 2022-11-16 NOTE — HISTORY OF PRESENT ILLNESS
[FreeTextEntry1] : 76yo M referred for Elevated PSA level\par MRI 10/31/2022: performed showed 2 lesions PIRADS 4: PS=41g\par \par PSA 4.02 on 9/2022\par PSA 3.86 on 4/2020\par \par +family hx of prostate CA in father

## 2022-11-16 NOTE — ASSESSMENT
[FreeTextEntry1] : 78yo M referred for Elevated PSA level\par MRI 10/31/2022: performed showed 2 lesions PIRADS 4: PS=41g\par PSA 4.02 on 9/2022\par +family hx of prostate CA in father\par \par Plan: TP Fusion Prostate biopsy\par I had a discussion with the patient regarding the implication of an elevated PSA and the need to biopsy the prostate using the transperineal approach. \par \par The potential side effects including bleeding and infection were also detailed. Additionally, we discussed the potential implication of a positive biopsy for prostate cancer, and depending on the grade and stage of the cancer the need for treatment including, active surveillance, radiation, radiation seed implants and surgery.  \par \par The patient has agreed to proceed with prostate biopsy. He will stop all aspirin and aspirin like products 10 days prior to the biopsy. There is no need for pre-procedural antibiotics, and he will self-administer a cleansing Fleet's enema just prior to the biopsy.  \par \par \par

## 2022-12-06 ENCOUNTER — RX RENEWAL (OUTPATIENT)
Age: 77
End: 2022-12-06

## 2022-12-09 ENCOUNTER — APPOINTMENT (OUTPATIENT)
Dept: CARDIOLOGY | Facility: CLINIC | Age: 77
End: 2022-12-09
Payer: MEDICARE

## 2022-12-09 ENCOUNTER — NON-APPOINTMENT (OUTPATIENT)
Age: 77
End: 2022-12-09

## 2022-12-09 VITALS
OXYGEN SATURATION: 98 % | HEIGHT: 69 IN | SYSTOLIC BLOOD PRESSURE: 128 MMHG | TEMPERATURE: 96.8 F | WEIGHT: 209 LBS | HEART RATE: 54 BPM | BODY MASS INDEX: 30.96 KG/M2 | DIASTOLIC BLOOD PRESSURE: 80 MMHG

## 2022-12-09 DIAGNOSIS — I70.0 ATHEROSCLEROSIS OF AORTA: ICD-10-CM

## 2022-12-09 PROCEDURE — 93000 ELECTROCARDIOGRAM COMPLETE: CPT

## 2022-12-09 PROCEDURE — 99214 OFFICE O/P EST MOD 30 MIN: CPT

## 2022-12-09 RX ORDER — (SALINE) 19; 7 G/133ML; G/133ML
ENEMA RECTAL
Qty: 1 | Refills: 0 | Status: DISCONTINUED | COMMUNITY
Start: 2022-11-16 | End: 2022-12-09

## 2022-12-09 RX ORDER — LEVOTHYROXINE SODIUM 0.11 MG/1
112 TABLET ORAL DAILY
Refills: 0 | Status: ACTIVE | COMMUNITY

## 2022-12-09 NOTE — REASON FOR VISIT
[Arrhythmia/ECG Abnorrmalities] : arrhythmia/ECG abnormalities [Structural Heart and Valve Disease] : structural heart and valve disease [Carotid, Aortic and Peripheral Vascular Disease] : carotid, aortic and peripheral vascular disease

## 2022-12-16 ENCOUNTER — NON-APPOINTMENT (OUTPATIENT)
Age: 77
End: 2022-12-16

## 2022-12-19 ENCOUNTER — NON-APPOINTMENT (OUTPATIENT)
Age: 77
End: 2022-12-19

## 2022-12-20 ENCOUNTER — APPOINTMENT (OUTPATIENT)
Dept: UROLOGY | Facility: HOSPITAL | Age: 77
End: 2022-12-20

## 2022-12-20 ENCOUNTER — RESULT REVIEW (OUTPATIENT)
Age: 77
End: 2022-12-20

## 2022-12-21 ENCOUNTER — NON-APPOINTMENT (OUTPATIENT)
Age: 77
End: 2022-12-21

## 2022-12-27 NOTE — ASSESSMENT
[FreeTextEntry1] : EKG and blood work reviewed.  \par Atorvastatin has been refilled.  \par Follow-up abdominal ultrasound and echocardiogram. \par \par Baseline abnormal EKG.\par Last stress 2019 no ischemia. \par Risk factor modification and medical therapy. \par \par Hypertensive heart disease \par Currently remains well controlled. \par Cont bisoprolol/hctz\par \par TAA\par Borderline abdominal aortic dimensions \par Aortic atherosclerosis \par Surveillance monitoring \par BP control. \par \par Continue beta blocker. Blood pressure control. \par Continue statin\par \par HLD, lipids well controlled. Adjunctive diet and lifestyle modification measures reviewed. \par \par Any questions and concerns were addressed and resolved. \par

## 2022-12-27 NOTE — HISTORY OF PRESENT ILLNESS
[FreeTextEntry1] : ZENOBIA KEENE  is a 77 year old  M\par \par History of VHD, dilated ao, arthritis, hypothyroidism, long-standing hypertension, and hyperlipidemia.\par \par There is no prior history of a clinical myocardial infarction, coronary revascularization. \par There is no history of symptomatic congestive heart failure rheumatic heart disease \par There is no history of symptomatic arrhythmias including atrial fibrillation.\par \par He walks a couple miles.  He does core exercises.  \par Active at home gym: lightweights, aerobics\par There is no exertional chest pain, pressure or discomfort. \par There is no significant dyspnea on exertion or orthopnea. \par There are no symptomatic palpitations, lightheadedness, dizziness or syncope.\par \par he is undergoing work-up with urology for an elevated PSA he is scheduled for prostate biopsy\par \par Today's EKG demonstrates sinus bradycardia with first-degree AV block left anterior fascicular block LVH and prior anteroseptal MI.  \par Last cholesterol LDL 55 A1c 6.1 hemoglobin 12.9 \par \par Echo 10/12/21 normal LV systolic function, EF 60%, mild MR, mild to mod AI, normal chamber dimensions\par Ao root 4.3cm, asc ao 4.5cm, arch 3.8cm (slight increase from prior) \par Abd ao 3.1cm, mild to mod atherosclerosis \par Nuclear stress test 5/19 no ischemia\par \par

## 2022-12-28 ENCOUNTER — APPOINTMENT (OUTPATIENT)
Dept: UROLOGY | Facility: CLINIC | Age: 77
End: 2022-12-28

## 2023-01-06 ENCOUNTER — NON-APPOINTMENT (OUTPATIENT)
Age: 78
End: 2023-01-06

## 2023-01-06 ENCOUNTER — APPOINTMENT (OUTPATIENT)
Dept: OPHTHALMOLOGY | Facility: CLINIC | Age: 78
End: 2023-01-06
Payer: MEDICARE

## 2023-01-06 PROCEDURE — 92014 COMPRE OPH EXAM EST PT 1/>: CPT

## 2023-01-06 PROCEDURE — 92134 CPTRZ OPH DX IMG PST SGM RTA: CPT

## 2023-01-09 ENCOUNTER — APPOINTMENT (OUTPATIENT)
Dept: UROLOGY | Facility: CLINIC | Age: 78
End: 2023-01-09
Payer: MEDICARE

## 2023-01-09 VITALS
HEART RATE: 49 BPM | SYSTOLIC BLOOD PRESSURE: 165 MMHG | HEIGHT: 69 IN | OXYGEN SATURATION: 99 % | WEIGHT: 205 LBS | TEMPERATURE: 96.4 F | BODY MASS INDEX: 30.36 KG/M2 | DIASTOLIC BLOOD PRESSURE: 82 MMHG

## 2023-01-09 PROCEDURE — 99215 OFFICE O/P EST HI 40 MIN: CPT

## 2023-01-09 NOTE — HISTORY OF PRESENT ILLNESS
[FreeTextEntry1] : 78yo M referred for Elevated PSA level. s/p bilateral hip replacement/arthroplasties.has hx of cardiac arrhythmias, Atherosclerosis. HTN. HL. \par MRI 10/31/2022: performed showed 2 lesions PIRADS 4: PS=41g / - NV bundle involv, - SV invasion, -LNpathy\par \par PSA 4.02 on 9/2022\par PSA 3.86 on 4/2020\par \par +family hx of prostate CA in father\par FU jan 9, 2022: s/p TP fusion Bx: Gl 7 (3+4). high volume 50%-100% in both target lesions and left anterior bx. pattern 4 up to 40% involvement per core.\par IPSS 7/ SHIM13\par Blood thinners: none\par performance status: excellent per patient.

## 2023-01-09 NOTE — LETTER BODY
[Dear  ___] : Dear  [unfilled], [Consult Letter:] : I had the pleasure of evaluating your patient, [unfilled]. [Please see my note below.] : Please see my note below. [Consult Closing:] : Thank you very much for allowing me to participate in the care of this patient.  If you have any questions, please do not hesitate to contact me. [Sincerely,] : Sincerely, [FreeTextEntry3] : \par Gage Beltrán MD\par Urologic Oncology\par Robotic & Endoscopic urology\par [facility]\par [facility]\par

## 2023-01-09 NOTE — ASSESSMENT
[FreeTextEntry1] : 78yo M referred for Elevated PSA level\par MRI 10/31/2022: performed showed 2 lesions PIRADS 4: PS=41g\par PSA 4.02 on 9/2022\par +family hx of prostate CA in father\par FU jan 9, 2022: s/p TP fusion Bx: Gl 7 (3+4). high volume 50%-100% in both target lesions and left anterior bx. pattern 4 up to 40% involvement per core.\par IPSS 7/ SHIM13\par Blood thinners: none\par \par Plan: Gl 7 (3+4) intermed risk PCa\par Refer to rad onc for discussion of options, Dr Kaz Silva (EBRT + ADT)\par \par I had a very lengthy and thorough discussion with Mr Rodriguez regarding the characteristics of his cancer and the risks, benefits, rationale, implications and alternatives of the various management options. I discouraged him from considering primary androgen deprivation therapy since it is not curative and is associated with multiple side effects, such as hot flashes, osteoporosis, decreased libido, erectile dysfunction, and a potentially higher risk of diabetes and heart disease. We spent the bulk of our time discussing three major options, which include radical prostatectomy, radiation therapy, and active surveillance with delayed curative intent. We discussed the rationale of radical prostatectomy performed via either a robotic or open technique.\par \par The concepts of the surgery are removal of the pelvic lymph nodes and prostate, with nerve-sparing as deemed appropriate. These have both diagnostic and therapeutic intent. When performed robotically it is through six small incisions and a 2.5 - 3.5 hour surgery associated with minimal blood loss, an overnight hospital stay, and 5-7 days of Hopper catheterization. Major risks of the surgery are rare but do include bleeding, infection, adjacent organ injury, lymphocele, positive margins, severe pain and standard operative risks such as myocardial infarction, stroke, DVT, pneumonia, PE and even a 0.2% chance of death. We also discussed urinary incontinence following surgery. Approximately 25% of men will have nearly complete control of their urination when the catheter is removed, the median time to recovery is approximately 3-4 months but can take up to 12-18 months. Approximately 96% of men will have excellent control of urination at one year following surgery. In regards to sexual function, I explained the median time to recovery of functional erections is 6-8 months but can take up to 18 months. At 18 months following surgery, approximately 75% of men with quality erections prior to surgery who undergo aggressive bilateral nerve-sparing will have functional erections with or without medical therapy. Following surgery the PSA is expected to remain undetectable but if it does rise there is the possibility of salvage curative radiation therapy, if deemed appropriate. \par \par We also discussed the rationale of radiation therapy, which can be delivered via brachytherapy, external beam radiation therapy, or proton beam therapy. There is an excellent track record of success but can be associated with potential side effects which include urinary urgency, frequency, urethral stricture as well as the potential for erectile dysfunction and rectal irritation or frequency of bowel movements. I explained there is an approximately 2% chance of serious bladder or rectal toxicity as well as a very low risk of inducing a secondary malignancy. The potential downside of radiation is the lack of complete pathologic review and lack of reliable salvage curative options. \par \par  Active surveillance was described as a reasonable but not the optimal management strategy for intermediate risk PCA  - it has been associated with excellent short and intermediate term cancer control for low risk PCA. The concept is to keep a watchful eye on the cancer and intervene as needed at any meaningful sign of increased tumor volume or increased grade. Delayed treatment has been associated with similar outcomes to immediate treatment. Approximately 15% of patients per year require an intervention. Lastly, we discussed the rationale of a re-biopsy of his prostate if he is interested in this approach since approximately 20% to 30% of patients will have higher grade or higher volume disease which exclude them from considering active surveillance. \par \par At the completion of our discussion, multiple questions were answered to the best of my ability. He appears to be very well informed about his cancer as well as the options. I explained to him there is no rush in making a decision. \par \par \par \par

## 2023-01-13 ENCOUNTER — APPOINTMENT (OUTPATIENT)
Dept: CARDIOLOGY | Facility: CLINIC | Age: 78
End: 2023-01-13
Payer: MEDICARE

## 2023-01-13 VITALS
SYSTOLIC BLOOD PRESSURE: 150 MMHG | BODY MASS INDEX: 29.62 KG/M2 | HEIGHT: 69 IN | WEIGHT: 200 LBS | OXYGEN SATURATION: 95 % | HEART RATE: 60 BPM | DIASTOLIC BLOOD PRESSURE: 82 MMHG

## 2023-01-13 PROCEDURE — 99214 OFFICE O/P EST MOD 30 MIN: CPT

## 2023-01-13 PROCEDURE — 93979 VASCULAR STUDY: CPT

## 2023-01-13 PROCEDURE — 93306 TTE W/DOPPLER COMPLETE: CPT

## 2023-01-17 NOTE — ADDENDUM
[FreeTextEntry1] : Please note the patient was reviewed with NP Madison Sanford.\par I was physically present during the service of the patient.\par I was directly involved in the management plan and recommendations of the care provided to the patient. \par I personally reviewed the history and physical examination as documented by the NP above.\par \par

## 2023-01-17 NOTE — HISTORY OF PRESENT ILLNESS
[FreeTextEntry1] : ZENOBIA KEENE  is a 77 year old  M\par \par History of VHD, dilated ao, arthritis, hypothyroidism, long-standing hypertension, and hyperlipidemia.\par \par There is no prior history of a clinical myocardial infarction, coronary revascularization. \par There is no history of symptomatic congestive heart failure rheumatic heart disease \par There is no history of symptomatic arrhythmias including atrial fibrillation.\par \par He walks a couple miles.  He does core exercises.  \par Active at home gym: lightweights, aerobics\par There is no exertional chest pain, pressure or discomfort. \par There is no significant dyspnea on exertion or orthopnea. \par There are no symptomatic palpitations, lightheadedness, dizziness or syncope.\par \par interim underwent prostate biopsy and told of prostate ca, there is planned f/u with onco to discuss txt strategy\par \par EKG demonstrates sinus bradycardia with first-degree AV block left anterior fascicular block LVH and prior anteroseptal MI.  \par Echo Jan 2023 EF 60% min MR, mild to mod AI  unchanged, aortic root 4.3cm unchanged\par Abd sono Jan 2023 no AAA\par Last cholesterol LDL 55 A1c 6.1 hemoglobin 12.9 \par \par Past testing for reference: \par Echo 10/12/21 normal LV systolic function, EF 60%, mild MR, mild to mod AI, normal chamber dimensions\par Ao root 4.3cm, asc ao 4.5cm\par Abd ao 3.1cm, mild to mod atherosclerosis \par Carotid doppler 7/2020 mild nonobx atherosclerosis \par Nuclear stress test 5/19 no ischemia\par \par

## 2023-01-17 NOTE — ASSESSMENT
[FreeTextEntry1] : ZENOBIA KEENE is a 77 year old M who presents today Jan 13, 2023 with the above history and the following active issues: \par \par Baseline abnormal EKG.\par Last stress 2019 no ischemia. \par Currently very active no angina\par Risk factor modification and preventive medical therapy. \par \par Hypertensive heart disease \par GIven enlarged aorta I encouraged stricter BP control,, has been elevated past two visits. \par He will start home BP log and will call in 1 mo to reassess\par low salt diet\par Cont bisoprolol/hctz\par \par TAA 4.3cm stable\par Borderline abdominal aortic dimensions prior 3.1cm normal dimension on today's US\par Aortic atherosclerosis \par Surveillance monitoring will be cont \par BP control. \par \par HLD\par LDL very well controlled at 55\par Continue statin\par Adjunctive diet and lifestyle modification measures reviewed. \par reduce carbs/sugars d/t borderline a1c\par \par Pt will call us if any prostate procedures are scheduled, would not anticipate a need for add'l CV testing unles new symptoms/change in clinical status\par Otherwise CV f/u in 6 mo\par Any questions and concerns were addressed and resolved. \par \par Sincerely,\par \par ISATU Thomas\par Patients history, testing, and plan reviewed with supervising MD: Dr. Silvestre Cedeño

## 2023-01-24 ENCOUNTER — OUTPATIENT (OUTPATIENT)
Dept: OUTPATIENT SERVICES | Facility: HOSPITAL | Age: 78
LOS: 1 days | Discharge: HOME | End: 2023-01-24
Payer: MEDICARE

## 2023-01-24 ENCOUNTER — NON-APPOINTMENT (OUTPATIENT)
Age: 78
End: 2023-01-24

## 2023-01-24 ENCOUNTER — APPOINTMENT (OUTPATIENT)
Dept: RADIATION ONCOLOGY | Facility: CLINIC | Age: 78
End: 2023-01-24
Payer: MEDICARE

## 2023-01-24 VITALS
OXYGEN SATURATION: 92 % | SYSTOLIC BLOOD PRESSURE: 163 MMHG | BODY MASS INDEX: 30.27 KG/M2 | DIASTOLIC BLOOD PRESSURE: 84 MMHG | HEART RATE: 124 BPM | WEIGHT: 205 LBS

## 2023-01-24 DIAGNOSIS — Z98.890 OTHER SPECIFIED POSTPROCEDURAL STATES: Chronic | ICD-10-CM

## 2023-01-24 DIAGNOSIS — Z96.649 PRESENCE OF UNSPECIFIED ARTIFICIAL HIP JOINT: Chronic | ICD-10-CM

## 2023-01-24 DIAGNOSIS — Z96.651 PRESENCE OF RIGHT ARTIFICIAL KNEE JOINT: Chronic | ICD-10-CM

## 2023-01-24 PROCEDURE — 99205 OFFICE O/P NEW HI 60 MIN: CPT | Mod: 25

## 2023-01-24 NOTE — VITALS
[Maximal Pain Intensity: 0/10] : 0/10 [Least Pain Intensity: 0/10] : 0/10 [Date: ____________] : Patient's last distress assessment performed on [unfilled]. [2 - Distress Level] : Distress Level: 2 [Patient given social work contact information and resource sheet] : Patient was given social work contact information and resource sheet [80: Normal activity with effort; some signs or symptoms of disease.] : 80: Normal activity with effort; some signs or symptoms of disease.

## 2023-01-24 NOTE — REVIEW OF SYSTEMS
[Nocturia] : nocturia [IPSS Score (0-40): ___] : IPSS score: [unfilled] [EPIC-CP Score (0-60): ___] : EPIC-CP score: [unfilled] [Negative] : Allergic/Immunologic [Constipation: Grade 0] : Constipation: Grade 0 [Diarrhea: Grade 0] : Diarrhea: Grade 0

## 2023-01-24 NOTE — DISEASE MANAGEMENT
[1] : T1 [c] : c [0] : M0 [0-10] : 0 -10 ng/mL [7(3+4)] : Fusion Biopsy [place] Score: 7(3+4) [IIB] : IIB [BiopsyDate] : 12/20/22 [MeasuredProstateVolume] : 41 [TotalCores] : 20 [TotalPositiveCores] : 8 [MaxCoreInvolvement] : 100 [FreeTextEntry7] : \par

## 2023-01-24 NOTE — PHYSICAL EXAM
[Sclera] : the sclera and conjunctiva were normal [Extraocular Movements] : extraocular movements were intact [Outer Ear] : the ears and nose were normal in appearance [Murmurs] : no murmurs present [Bowel Sounds] : normal bowel sounds [Abdomen Soft] : soft [Nondistended] : nondistended [Abdomen Tenderness] : non-tender [No Spine Tenderness] : no tenderness to palpation of the vertebral spine [Normal] : no focal deficits [de-identified] : regularly irregular

## 2023-01-24 NOTE — HISTORY OF PRESENT ILLNESS
[FreeTextEntry1] : 77 year old gentleman presents today for consultation regarding radiation therapy for prostate carcinoma.\par \par He was noted to have an elevated PSA of 4.02 ng/mL on 9/28/22 (PSA free 0.38 ng/mL; PSA free% 9%).  Previously, PSA 3.81 ng/mL on 4/13/20, and 3.77 ng/mL on 3/15/19.\par \par He met with Dr. Sushil Fuller on 10/25/22.\par \par 10/31/22 MRI pelvis showed a 41 cc gland, with 2 suspicious lesions (left anterior mid gland, right posterolateral midgland).  The first lesion bulges the capsule, while the latter abuts without deforming the capsule.  No neurovascular bundle invasion, SV invasion, or pelvic lymphadenopathy.\par \par He met with Dr. Gage Beltrán on 11/16/22, and underwent trans-rectal ultrasound-guided biopsy on 12/20/22.  Biopsy pathology from the targeted lesions showed Gle 3+4=7 prostate adenocarcinoma, involving up to 100% of the lesion, with perineural invasion present.  Biopsy of the left anterior also showed Gle 3+4=7.  Total 8/20 cores.\par \par Reports nocturia x2, occasional dribbling, occasional urgency and occasional weak stream.  No dysuria, hesitancy, hematuria, bony pain,change in bowel, fatigue, or erectile dysfunction.  Intentional weight loss with decreased intake.\par

## 2023-01-24 NOTE — LETTER CLOSING
Subjective:       Patient ID: Jose G Shafer is a 56 y.o. male.    Chief Complaint: Nausea    HPI  Nausea every morning.  No vomiting.  Has chronic acid reflux and uses Nexium daily, if he does not use this will have heartburn and reflux.  Reports dysphagia with certain foods like meats.  Has had to regurgitate for relief.  Father had esophageal cancer.  He has never had EGD.   Bowel habit is changed over the last 3-5 months with more frequent stools.  No blood with bowel movements or black stools.  No diarrhea.   Colonoscopy in 2010 with colon polyps. Denies family history of colon cancer.  He uses Ibuprofen regularly for arthritic complaints.   Review of Systems   Constitutional: Negative.  Negative for activity change, appetite change, fatigue, fever and unexpected weight change.   HENT: Positive for trouble swallowing. Negative for sore throat.    Respiratory: Negative.  Negative for cough, choking and shortness of breath.    Cardiovascular: Negative.  Negative for chest pain.   Gastrointestinal: Positive for nausea. Negative for abdominal distention, abdominal pain, blood in stool, constipation, diarrhea and vomiting.   Genitourinary: Negative.  Negative for difficulty urinating, dysuria and hematuria.   Musculoskeletal: Negative.  Negative for neck pain and neck stiffness.   Skin: Negative.    Neurological: Negative.  Negative for dizziness, syncope, weakness and light-headedness.   Psychiatric/Behavioral: Negative.        Objective:      Physical Exam   Constitutional: He is oriented to person, place, and time. He appears well-developed and well-nourished. No distress.   HENT:   Head: Normocephalic.   Eyes: No scleral icterus.   Neck: Neck supple.   Cardiovascular: Normal rate.    Pulmonary/Chest: Effort normal. No respiratory distress. He exhibits no tenderness.   Abdominal: Soft. Bowel sounds are normal. He exhibits no distension and no mass. There is tenderness in the epigastric area.    Musculoskeletal: Normal range of motion.   Neurological: He is alert and oriented to person, place, and time.   Skin: Skin is warm and dry. He is not diaphoretic. No pallor.   Psychiatric: He has a normal mood and affect. His behavior is normal. Judgment and thought content normal.   Vitals reviewed.      Assessment:       1. Colon cancer screening    2. History of colon polyps    3. Gastroesophageal reflux disease, esophagitis presence not specified    4. Abdominal pain, epigastric    5. Dysphagia, unspecified type        Plan:         Jose G was seen today for nausea.    Diagnoses and all orders for this visit:    Colon cancer screening    History of colon polyps    Gastroesophageal reflux disease, esophagitis presence not specified    Abdominal pain, epigastric    Dysphagia, unspecified type    Other orders  -     Case request GI: ESOPHAGOGASTRODUODENOSCOPY (EGD), COLONOSCOPY    I have explained the planned procedures to the patient.The risks, benefits and alternatives of the procedure were also explained in detail. Patient verbalized understanding, all questions were answered. The patient agrees to proceed as planned    Continue Nexium.  Discussed reflux prevention.  Avoid NSAIDs.   [Consult Closing:] : Thank you for allowing me to participate in the care of this patient.  If you have any questions, please do not hesitate to contact me. [Sincerely yours,] : Sincerely yours, [FreeTextEntry3] : Kaz Silva MD\par  of Radiation Medicine, Quality and Safety\par  of Radiation Medicine\par [facility]\par [facility]\par [facility]\par

## 2023-02-01 ENCOUNTER — APPOINTMENT (OUTPATIENT)
Dept: UROLOGY | Facility: CLINIC | Age: 78
End: 2023-02-01
Payer: MEDICARE

## 2023-02-01 VITALS
BODY MASS INDEX: 30.36 KG/M2 | DIASTOLIC BLOOD PRESSURE: 69 MMHG | OXYGEN SATURATION: 96 % | WEIGHT: 205 LBS | TEMPERATURE: 96.4 F | HEIGHT: 69 IN | HEART RATE: 50 BPM | SYSTOLIC BLOOD PRESSURE: 162 MMHG

## 2023-02-01 PROCEDURE — 99214 OFFICE O/P EST MOD 30 MIN: CPT

## 2023-02-01 NOTE — ASSESSMENT
[FreeTextEntry1] : 76yo M referred for Elevated PSA level\par MRI 10/31/2022: performed showed 2 lesions PIRADS 4: PS=41g\par PSA 4.02 on 9/2022\par +family hx of prostate CA in father\par FU jan 9, 2022: s/p TP fusion Bx: Gl 7 (3+4). high volume 50%-100% in both target lesions and left anterior bx. pattern 4 up to 40% involvement per core.\par IPSS 7/ SHIM13\par Blood thinners: none\par FU Feb 2023: Seen by Dr Silva Rad Onc.\par \par Plan: Gl 7 (3+4) intermediate risk PCa\par \par Patient has discussed RALP with me and Radiation ADT with Dr Silva\par Decided for EBRT + ADT\par Bicalutamide 50 mg daily x 1 month\par Will start Eligard at 3 weeks\par will need PSA in 6 weeks from today\par

## 2023-02-01 NOTE — HISTORY OF PRESENT ILLNESS
[FreeTextEntry1] : Past Uro hx: 78 yo M referred for Elevated PSA level. s/p bilateral hip replacement/arthroplasties.has hx of cardiac arrhythmias, Atherosclerosis. HTN. HL. \par MRI 10/31/2022: performed showed 2 lesions PIRADS 4: PS=41g / -ve NV bundle involvement, - SV invasion, -LNopathy\par \par PSA 4.02 on 9/2022\par PSA 3.86 on 4/2020\par \par +family hx of prostate CA in father\par FU Jan 9, 2022: s/p TP fusion Bx: Gl 7 (3+4). high volume 50%-100% in both target lesions and left anterior bx. pattern 4 up to 40% involvement per core.\par IPSS 7/ ABILIO 13\par Blood thinners: none\par performance status: excellent per patient.\par \par FU Feb 2023: Seen by Dr Silva Rad Onc.

## 2023-02-01 NOTE — LETTER BODY
[Dear  ___] : Dear  [unfilled], [Consult Letter:] : I had the pleasure of evaluating your patient, [unfilled]. [Please see my note below.] : Please see my note below. [Consult Closing:] : Thank you very much for allowing me to participate in the care of this patient.  If you have any questions, please do not hesitate to contact me. [Sincerely,] : Sincerely, [FreeTextEntry3] : \par Gage Beltrán MD\par Urologic Oncology\par Robotic & Endoscopic urology\par Formerly [facility] [place]\par [facility]\par

## 2023-02-15 ENCOUNTER — NON-APPOINTMENT (OUTPATIENT)
Age: 78
End: 2023-02-15

## 2023-02-17 PROCEDURE — 77263 THER RADIOLOGY TX PLNG CPLX: CPT

## 2023-02-22 ENCOUNTER — APPOINTMENT (OUTPATIENT)
Dept: UROLOGY | Facility: CLINIC | Age: 78
End: 2023-02-22
Payer: MEDICARE

## 2023-02-22 VITALS
OXYGEN SATURATION: 96 % | DIASTOLIC BLOOD PRESSURE: 67 MMHG | SYSTOLIC BLOOD PRESSURE: 147 MMHG | WEIGHT: 205 LBS | HEART RATE: 54 BPM | BODY MASS INDEX: 30.36 KG/M2 | TEMPERATURE: 96.4 F | HEIGHT: 69 IN

## 2023-02-22 PROCEDURE — 96402 CHEMO HORMON ANTINEOPL SQ/IM: CPT

## 2023-02-22 RX ORDER — LEUPROLIDE ACETATE 30 MG/.5ML
30 INJECTION, SUSPENSION, EXTENDED RELEASE SUBCUTANEOUS
Qty: 1 | Refills: 0 | Status: ACTIVE | COMMUNITY
Start: 2023-02-22

## 2023-02-22 RX ADMIN — LEUPROLIDE ACETATE 0 MG: KIT SUBCUTANEOUS at 00:00

## 2023-02-28 ENCOUNTER — NON-APPOINTMENT (OUTPATIENT)
Age: 78
End: 2023-02-28

## 2023-02-28 ENCOUNTER — OUTPATIENT (OUTPATIENT)
Dept: OUTPATIENT SERVICES | Facility: HOSPITAL | Age: 78
LOS: 1 days | Discharge: HOME | End: 2023-02-28
Payer: MEDICARE

## 2023-02-28 DIAGNOSIS — Z98.890 OTHER SPECIFIED POSTPROCEDURAL STATES: Chronic | ICD-10-CM

## 2023-02-28 DIAGNOSIS — Z96.649 PRESENCE OF UNSPECIFIED ARTIFICIAL HIP JOINT: Chronic | ICD-10-CM

## 2023-02-28 DIAGNOSIS — Z96.651 PRESENCE OF RIGHT ARTIFICIAL KNEE JOINT: Chronic | ICD-10-CM

## 2023-02-28 PROCEDURE — 76872 US TRANSRECTAL: CPT | Mod: 26

## 2023-02-28 PROCEDURE — 55874 TPRNL PLMT BIODEGRDABL MATRL: CPT

## 2023-02-28 PROCEDURE — 55876 PLACE RT DEVICE/MARKER PROS: CPT

## 2023-02-28 RX ORDER — DIAZEPAM 5 MG/1
5 TABLET ORAL
Qty: 0 | Refills: 0 | Status: COMPLETED | OUTPATIENT
Start: 2023-02-28

## 2023-03-08 NOTE — REASON FOR VISIT
[Spouse] : spouse [FreeTextEntry2] : Transperineal insertion of SpaceOAR gel and fiducial marker/beacon placement

## 2023-03-08 NOTE — PROCEDURE
[FreeTextEntry1] : Space Oar and Fiducial Marker in Prostate.\par  [FreeTextEntry2] : [place]  3+4=7 prostate adenocarcinoma, involving up to 100% of the lesion, with perineural invasion present. [FreeTextEntry3] : \par  \par \par Procedure Note: In preparation for the procedure, he self-administered an enema one hour before leaving home and was NPO the night before procedure. He was prescribed a 3 days course of oral antibiotics twice daily to be started a day prior to the procedure.  Topical TAE cream was applied to the perineal area one hour prior to procedure. Patient was prescribed and took Valium 5mg and Tylenol 650 mg upon arrival in the department 40 minutes prior to procedure.\par \par  \par \par Procedure risk and benefits were reviewed with patient and a written consent was obtained prior to procedure. A time out was observed with patient name, date of birth, procedure, position, and site verified.\par \par  \par \par Patient was placed in a lithotomy position. Chloral prep was used to prep the skin. While maintaining aseptic technique, an ultrasound probe was inserted into the rectum to visualize the prostate. Less than 10 cc of Lidocaine 2% and sodium bicarbonate 8.4% was injected subcutaneously. Afterwards, 20 cc of Lidocaine and sodium bicarbonate was injected internally at the prostate apex and bilateral neurovascular bundles for the nerve block. Three fiducial markers were prepared on the sterile field. One fiducial marker was placed into each of the following sites: left lobe base, right lobe base, and apex, via 17 gauge needles under ultrasound guidance. Next, the hydrogel spacer kit was opened onto the sterile field and the hydrogel injection apparatus was prepared. An 18 gauge needle was positioned into the mid-line perirectal fat between the anterior rectal wall and prostate under ultrasound guidance. Less than 10 cc of saline was injected via the needle to hydrodissect the space and confirm proper placement in both axial and sagittal views. The syringe was aspirated to confirm the needle was extravascular. The syringe was replaced with the hydrogel injection apparatus and the gel was injected over about 10-15 seconds. The needle was then removed. There was minimal blood loss. The patient tolerated procedure well.\par \par  \par \par Patient was transferred to the recovery area on a monitor.  VSS. Tolerated fluid and a snack by mouth and was made comfortable. He denied pain. Post procedure instructions were given and reviewed with patient. Email was sent to Dr Silva regarding completion of procedure and patient need for a CT/Sim apt.  Patient voided prior to discharge.\par \par

## 2023-03-09 ENCOUNTER — APPOINTMENT (OUTPATIENT)
Dept: RADIATION ONCOLOGY | Facility: CLINIC | Age: 78
End: 2023-03-09
Payer: MEDICARE

## 2023-03-09 ENCOUNTER — NON-APPOINTMENT (OUTPATIENT)
Age: 78
End: 2023-03-09

## 2023-03-09 VITALS
DIASTOLIC BLOOD PRESSURE: 78 MMHG | HEART RATE: 50 BPM | TEMPERATURE: 97 F | SYSTOLIC BLOOD PRESSURE: 160 MMHG | BODY MASS INDEX: 29.77 KG/M2 | OXYGEN SATURATION: 97 % | HEIGHT: 69 IN | WEIGHT: 201 LBS | RESPIRATION RATE: 16 BRPM

## 2023-03-09 PROCEDURE — 99214 OFFICE O/P EST MOD 30 MIN: CPT

## 2023-03-09 RX ORDER — BICALUTAMIDE 50 MG/1
50 TABLET ORAL
Qty: 30 | Refills: 0 | Status: COMPLETED | COMMUNITY
Start: 2023-02-01 | End: 2023-03-09

## 2023-03-09 NOTE — PHYSICAL EXAM
[Normal] : oriented to person, place and time, the affect was normal, the mood was normal and not anxious [Extraocular Movements] : extraocular movements were intact [Outer Ear] : the ears and nose were normal in appearance [Bowel Sounds] : normal bowel sounds [Abdomen Soft] : soft [Nondistended] : nondistended

## 2023-03-09 NOTE — DISEASE MANAGEMENT
[2] : T2 [5] : 5 [Extracapsular Extension] : Extracapsular extension [BiopsyDate] : 12/20/22 [MeasuredProstateVolume] : 41 [TotalCores] : 20 [TotalPositiveCores] : 8 [MaxCoreInvolvement] : 100 [FreeTextEntry7] : \par

## 2023-03-09 NOTE — HISTORY OF PRESENT ILLNESS
[FreeTextEntry1] : 77 year old gentleman presents today for consultation regarding radiation therapy for prostate carcinoma.\par \par Recall, He was noted to have an elevated PSA of 4.02 ng/mL on 9/28/22 (PSA free 0.38 ng/mL; PSA free% 9%).  \par \par 10/31/22 MRI pelvis showed a 41 cc gland, with 2 suspicious lesions (left anterior mid gland, right posterolateral midgland).  The first lesion bulges the capsule, while the latter abuts without deforming the capsule.  No neurovascular bundle invasion, SV invasion, or pelvic lymphadenopathy.\par \par 12/20/22 trans-rectal ultrasound-guided biopsy on 12/20/22.  Biopsy pathology from the targeted lesions showed Gle 3+4=7 prostate adenocarcinoma, involving up to 100% of the lesion, with perineural invasion present.  Biopsy of the left anterior also showed Gle 3+4=7.  Total 8/20 cores.\par \par Interval history:\par He elected to undergo EBRT + ADT.\par He started ADT with bicalutamide x 1 month, and Eligard on 2/22/23.\par \par He underwent fiducial marker and SpaceOAR placement on 2/28/23 by Dr. Kelly Weeks.\par \par Currently, he reports nocturia x2, occasional dribbling, occasional urgency and occasional weak stream.  No dysuria, hesitancy, hematuria, bony pain,change in bowel, fatigue, or erectile dysfunction.  Intentional weight loss with decreased intake.\par \par Today, IPSS 6 EPIC 6.

## 2023-03-09 NOTE — REVIEW OF SYSTEMS
[Nocturia] : nocturia [IPSS Score (0-40): ___] : IPSS score: [unfilled] [EPIC-CP Score (0-60): ___] : EPIC-CP score: [unfilled] [Negative] : Allergic/Immunologic

## 2023-03-17 PROCEDURE — 77300 RADIATION THERAPY DOSE PLAN: CPT | Mod: 26

## 2023-03-17 PROCEDURE — 77301 RADIOTHERAPY DOSE PLAN IMRT: CPT | Mod: 26

## 2023-03-17 PROCEDURE — 77334 RADIATION TREATMENT AID(S): CPT | Mod: 26,59

## 2023-03-24 NOTE — PATIENT PROFILE ADULT - ARE SIGNIFICANT INDICATORS COMPLETE.
Gala Ojeda                                                                                                                  3/24/2023  MRN:   [id number]  YOB: 1976  PCP:                           JUSTICE Duran CNP  Referring Physician: No ref. provider found  Treating Physician Name: Lorin Oliveros MD      Reason for visit:  Chief Complaint   Patient presents with    Follow-up     Review status of disease   Presents to the clinic to discuss results of lab work-up and imaging studies    Current problems:  Iron deficiency anemia with malabsorption component  Elevated ferritin, reactive  Mild hepatic steatosis    Active and recent treatments:  Parenteral iron-3/2021  Oral iron with vitamin C     Summary of Case/History:    Gala Ojeda a 51 y. o.female is a patient with anemia. She has history of anemia and was managed previously by Dr. Eliza Headley with IV iron. Her most recent lab work shows recurrent iron deficiency anemia and she reports she historically received monthly B12 injections and IV iron every 3-6 months. She has history of scleroderma and gastric by-pass, done in 2009, contributing to iron malabsorption. She reports she has had zinc and D deficiencies, she does not have any bariatric vitamins. She is very symptomatic with dizziness, cold intolerance and feeling very fatigued. She denies any bleeding. She has history of menorrhagia with irregular periods, she had benign growth removed from outside of the uterus in 2012. She has family history of uterine fibroids and cancer. Interim History:     Patient presents to the clinic for a follow-up visit and to discuss results of her lab work-up. Continues to be very tired all the time. Lab work-up does not suggest iron deficiency. Patient ferritin remains elevated. Ultrasound liver shows mild hepatic steatosis. Mammogram was unremarkable.     During this visit patient's allergy, social, medical, surgical history and
Yes

## 2023-03-27 PROCEDURE — 77427 RADIATION TX MANAGEMENT X5: CPT

## 2023-03-27 PROCEDURE — 77387B: CUSTOM | Mod: 26

## 2023-03-28 PROCEDURE — 77387B: CUSTOM | Mod: 26

## 2023-03-29 PROCEDURE — 77387B: CUSTOM | Mod: 26

## 2023-03-30 ENCOUNTER — NON-APPOINTMENT (OUTPATIENT)
Age: 78
End: 2023-03-30

## 2023-03-30 VITALS
SYSTOLIC BLOOD PRESSURE: 172 MMHG | OXYGEN SATURATION: 90 % | HEART RATE: 64 BPM | RESPIRATION RATE: 16 BRPM | WEIGHT: 206 LBS | BODY MASS INDEX: 30.42 KG/M2 | DIASTOLIC BLOOD PRESSURE: 83 MMHG

## 2023-03-30 PROCEDURE — 77387B: CUSTOM | Mod: 26

## 2023-03-30 NOTE — REVIEW OF SYSTEMS
[Constipation: Grade 1 - Occasional or intermittent symptoms; occasional use of stool softeners, laxatives, dietary modification, or enema] : Constipation: Grade 1 - Occasional or intermittent symptoms; occasional use of stool softeners, laxatives, dietary modification, or enema [Edema Limbs: Grade 0] : Edema Limbs: Grade 0  [Fatigue: Grade 0] : Fatigue: Grade 0 [Localized Edema: Grade 0] : Localized Edema: Grade 0  [Neck Edema: Grade 0] : Neck Edema: Grade 0 [Hematuria: Grade 0] : Hematuria: Grade 0 [Urinary Incontinence: Grade 0] : Urinary Incontinence: Grade 0  [Urinary Retention: Grade 1 - Urinary, suprapubic or intermittent catheter placement not indicated; able to void with some residual] : Urinary Retention: Grade 1 - Urinary, suprapubic or intermittent catheter placement not indicated; able to void with some residual [Urinary Tract Pain: Grade 0] : Urinary Tract Pain: Grade 0 [Urinary Urgency: Grade 0] : Urinary Urgency: Grade 0 [Urinary Frequency: Grade 1 - Present] : Urinary Frequency: Grade 1 - Present

## 2023-03-30 NOTE — REASON FOR VISIT
[Routine On-Treatment] : a routine on-treatment visit for [Spouse] : spouse [Prostate Cancer] : prostate cancer

## 2023-03-30 NOTE — PHYSICAL EXAM
[Normal] : oriented to person, place and time, the affect was normal, the mood was normal and not anxious [Extraocular Movements] : extraocular movements were intact [Outer Ear] : the ears and nose were normal in appearance [Bowel Sounds] : normal bowel sounds [Abdomen Soft] : soft [Nondistended] : nondistended [Abdomen Tenderness] : non-tender

## 2023-03-30 NOTE — DISEASE MANAGEMENT
[Clinical] : TNM Stage: c [IIB] : IIB [2] : T2 [c] : c [0] : M0 [0-10] : 0 -10 ng/mL [Biopsy with Fusion] : Patient had a biopsy with fusion on [7(3+4)] : Fusion Biopsy Pennington Score: 7(3+4) [] : Patient had a Prostate MRI [5] : 5 [Extracapsular Extension] : Extracapsular extension [BiopsyDate] : 12/20/22 [MeasuredProstateVolume] : 41 [TotalCores] : 20 [TotalPositiveCores] : 8 [MaxCoreInvolvement] : 100 [FreeTextEntry7] : \par  [FreeTextEntry4] : adenocarcinoma of the prostate [TTNM] : 2c [NTNM] : 0 [MTNM] : 0 [de-identified] : 1000 [de-identified] : 6587 [de-identified] : pelvis

## 2023-03-31 PROCEDURE — 77387B: CUSTOM | Mod: 26

## 2023-04-03 PROCEDURE — 77427 RADIATION TX MANAGEMENT X5: CPT

## 2023-04-03 PROCEDURE — 77387B: CUSTOM | Mod: 26

## 2023-04-04 PROCEDURE — 77387B: CUSTOM | Mod: 26

## 2023-04-04 NOTE — PHYSICAL EXAM
[Extraocular Movements] : extraocular movements were intact [Outer Ear] : the ears and nose were normal in appearance [Bowel Sounds] : normal bowel sounds [Abdomen Soft] : soft [Nondistended] : nondistended [Abdomen Tenderness] : non-tender [Normal] : oriented to person, place and time, the affect was normal, the mood was normal and not anxious

## 2023-04-04 NOTE — REASON FOR VISIT
[Routine On-Treatment] : a routine on-treatment visit for [Prostate Cancer] : prostate cancer [Spouse] : spouse stable

## 2023-04-05 PROCEDURE — 77387B: CUSTOM | Mod: 26

## 2023-04-06 ENCOUNTER — NON-APPOINTMENT (OUTPATIENT)
Age: 78
End: 2023-04-06

## 2023-04-06 VITALS
BODY MASS INDEX: 30.57 KG/M2 | OXYGEN SATURATION: 99 % | WEIGHT: 207 LBS | SYSTOLIC BLOOD PRESSURE: 190 MMHG | HEART RATE: 45 BPM | DIASTOLIC BLOOD PRESSURE: 95 MMHG | RESPIRATION RATE: 16 BRPM

## 2023-04-06 PROCEDURE — 77387B: CUSTOM | Mod: 26

## 2023-04-06 NOTE — DISEASE MANAGEMENT
[Clinical] : TNM Stage: c [IIB] : IIB [FreeTextEntry4] : adenocarcinoma of the prostate [TTNM] : 2c [NTNM] : 0 [MTNM] : 0 [de-identified] : 0271 [de-identified] : 5697 [de-identified] : pelvis

## 2023-04-07 PROCEDURE — 77387B: CUSTOM | Mod: 26

## 2023-04-07 NOTE — REVIEW OF SYSTEMS
[Constipation: Grade 1 - Occasional or intermittent symptoms; occasional use of stool softeners, laxatives, dietary modification, or enema] : Constipation: Grade 1 - Occasional or intermittent symptoms; occasional use of stool softeners, laxatives, dietary modification, or enema [Edema Limbs: Grade 0] : Edema Limbs: Grade 0  [Fatigue: Grade 0] : Fatigue: Grade 0 [Localized Edema: Grade 0] : Localized Edema: Grade 0  [Neck Edema: Grade 0] : Neck Edema: Grade 0 [Hematuria: Grade 0] : Hematuria: Grade 0 [Urinary Incontinence: Grade 0] : Urinary Incontinence: Grade 0  [Urinary Retention: Grade 1 - Urinary, suprapubic or intermittent catheter placement not indicated; able to void with some residual] : Urinary Retention: Grade 1 - Urinary, suprapubic or intermittent catheter placement not indicated; able to void with some residual [Urinary Urgency: Grade 0] : Urinary Urgency: Grade 0 [Urinary Tract Pain: Grade 0] : Urinary Tract Pain: Grade 0 [Urinary Frequency: Grade 1 - Present] : Urinary Frequency: Grade 1 - Present

## 2023-04-10 PROCEDURE — 77427 RADIATION TX MANAGEMENT X5: CPT

## 2023-04-10 PROCEDURE — 77387B: CUSTOM | Mod: 26

## 2023-04-11 PROCEDURE — 77387B: CUSTOM | Mod: 26

## 2023-04-12 PROCEDURE — 77387B: CUSTOM | Mod: 26

## 2023-04-13 ENCOUNTER — NON-APPOINTMENT (OUTPATIENT)
Age: 78
End: 2023-04-13

## 2023-04-13 VITALS
HEIGHT: 69 IN | BODY MASS INDEX: 30.36 KG/M2 | TEMPERATURE: 97 F | HEART RATE: 54 BPM | DIASTOLIC BLOOD PRESSURE: 79 MMHG | SYSTOLIC BLOOD PRESSURE: 159 MMHG | WEIGHT: 205 LBS | OXYGEN SATURATION: 98 % | RESPIRATION RATE: 16 BRPM

## 2023-04-13 PROCEDURE — 77387B: CUSTOM | Mod: 26

## 2023-04-13 NOTE — DISEASE MANAGEMENT
[Clinical] : TNM Stage: c [IIB] : IIB [FreeTextEntry4] : adenocarcinoma of the prostate [TTNM] : 2c [NTNM] : 0 [MTNM] : 0 [de-identified] : 8844 [de-identified] : 4020 [de-identified] : pelvis

## 2023-04-14 PROCEDURE — 77387B: CUSTOM | Mod: 26

## 2023-04-17 PROCEDURE — 77387B: CUSTOM | Mod: 26

## 2023-04-17 PROCEDURE — 77427 RADIATION TX MANAGEMENT X5: CPT

## 2023-04-18 PROCEDURE — 77387B: CUSTOM | Mod: 26

## 2023-04-19 PROCEDURE — 77387B: CUSTOM | Mod: 26

## 2023-04-20 ENCOUNTER — NON-APPOINTMENT (OUTPATIENT)
Age: 78
End: 2023-04-20

## 2023-04-20 PROCEDURE — 77387B: CUSTOM | Mod: 26

## 2023-04-21 ENCOUNTER — NON-APPOINTMENT (OUTPATIENT)
Age: 78
End: 2023-04-21

## 2023-04-21 VITALS
BODY MASS INDEX: 29.98 KG/M2 | RESPIRATION RATE: 16 BRPM | SYSTOLIC BLOOD PRESSURE: 140 MMHG | OXYGEN SATURATION: 96 % | WEIGHT: 203 LBS | HEART RATE: 69 BPM | DIASTOLIC BLOOD PRESSURE: 77 MMHG

## 2023-04-21 PROCEDURE — 77387B: CUSTOM | Mod: 26

## 2023-04-21 NOTE — DISEASE MANAGEMENT
[FreeTextEntry4] : adenocarcinoma of the prostate [TTNM] : 2c [NTNM] : 0 [MTNM] : 0 [de-identified] : 3231 [de-identified] : 8398 [de-identified] : pelvis

## 2023-04-24 PROCEDURE — 77387B: CUSTOM | Mod: 26

## 2023-04-24 PROCEDURE — 77427 RADIATION TX MANAGEMENT X5: CPT

## 2023-04-24 NOTE — REVIEW OF SYSTEMS
[Constipation: Grade 1 - Occasional or intermittent symptoms; occasional use of stool softeners, laxatives, dietary modification, or enema] : Constipation: Grade 1 - Occasional or intermittent symptoms; occasional use of stool softeners, laxatives, dietary modification, or enema [Edema Limbs: Grade 0] : Edema Limbs: Grade 0  [Fatigue: Grade 1 - Fatigue relieved by rest] : Fatigue: Grade 1 - Fatigue relieved by rest [Localized Edema: Grade 0] : Localized Edema: Grade 0  [Neck Edema: Grade 0] : Neck Edema: Grade 0 [Hematuria: Grade 0] : Hematuria: Grade 0 [Urinary Incontinence: Grade 0] : Urinary Incontinence: Grade 0  [Urinary Retention: Grade 1 - Urinary, suprapubic or intermittent catheter placement not indicated; able to void with some residual] : Urinary Retention: Grade 1 - Urinary, suprapubic or intermittent catheter placement not indicated; able to void with some residual [Urinary Tract Pain: Grade 0] : Urinary Tract Pain: Grade 0 [Urinary Urgency: Grade 0] : Urinary Urgency: Grade 0 [Urinary Frequency: Grade 1 - Present] : Urinary Frequency: Grade 1 - Present

## 2023-04-25 ENCOUNTER — NON-APPOINTMENT (OUTPATIENT)
Age: 78
End: 2023-04-25

## 2023-04-25 PROCEDURE — 77387B: CUSTOM | Mod: 26

## 2023-04-26 PROCEDURE — 77387B: CUSTOM | Mod: 26

## 2023-04-27 ENCOUNTER — NON-APPOINTMENT (OUTPATIENT)
Age: 78
End: 2023-04-27

## 2023-04-27 VITALS
RESPIRATION RATE: 16 BRPM | BODY MASS INDEX: 29.98 KG/M2 | DIASTOLIC BLOOD PRESSURE: 75 MMHG | HEART RATE: 56 BPM | WEIGHT: 203 LBS | SYSTOLIC BLOOD PRESSURE: 151 MMHG | OXYGEN SATURATION: 94 %

## 2023-04-27 PROCEDURE — 77387B: CUSTOM | Mod: 26

## 2023-04-27 NOTE — DISEASE MANAGEMENT
[Clinical] : TNM Stage: c [IIB] : IIB [FreeTextEntry4] : adenocarcinoma of the prostate [TTNM] : 2c [NTNM] : 0 [MTNM] : 0 [de-identified] : 3911 [de-identified] : 4513 [de-identified] : pelvis

## 2023-04-28 PROCEDURE — 77387B: CUSTOM | Mod: 26

## 2023-05-01 PROCEDURE — 77387B: CUSTOM | Mod: 26

## 2023-05-02 PROCEDURE — 77387B: CUSTOM | Mod: 26

## 2023-05-03 PROCEDURE — 77387B: CUSTOM | Mod: 26

## 2023-05-05 NOTE — HISTORY OF PRESENT ILLNESS
[Follow up with Radiation MD in _____] : Follow up with Radiation MD in [unfilled] [Follow up with Oncologist in _____] : Follow up with Oncologist in [unfilled] [Follow up with Surgeon in _____] : Follow up with Surgeon in [unfilled] [Primary care physician] : primary care physician [Physical Functioning] : Physical functioning [Fatigue] : Fatigue [Sexual Functioning] : Sexual functioning [FreeTextEntry8] : Peggy Cao

## 2023-06-05 ENCOUNTER — APPOINTMENT (OUTPATIENT)
Dept: RADIATION ONCOLOGY | Facility: CLINIC | Age: 78
End: 2023-06-05
Payer: MEDICARE

## 2023-06-05 ENCOUNTER — NON-APPOINTMENT (OUTPATIENT)
Age: 78
End: 2023-06-05

## 2023-06-05 VITALS
OXYGEN SATURATION: 98 % | HEART RATE: 78 BPM | WEIGHT: 205 LBS | SYSTOLIC BLOOD PRESSURE: 114 MMHG | DIASTOLIC BLOOD PRESSURE: 71 MMHG | BODY MASS INDEX: 30.27 KG/M2 | RESPIRATION RATE: 16 BRPM

## 2023-06-05 PROCEDURE — 99024 POSTOP FOLLOW-UP VISIT: CPT

## 2023-06-05 NOTE — HISTORY OF PRESENT ILLNESS
[FreeTextEntry1] : 77 year old man returns today s/p 7,000cGY to the pelvis for a [id number] Jazmin 3+4=7 Adenocarcinoma of the prostate completed 5/3/23\par Reports nocturia x3, intermittent dysuria,intermittent urgency, intermittent hesitancy & dribbling\par Denies hematuria, bony pain, change in bowel, fatigue & weight loss\par \par Dr. Beltrán (uro) 6/12/23 for next injection\par Dr. Cedeño (card) 7/14/23\par

## 2023-06-12 ENCOUNTER — APPOINTMENT (OUTPATIENT)
Dept: UROLOGY | Facility: CLINIC | Age: 78
End: 2023-06-12
Payer: MEDICARE

## 2023-06-12 VITALS
HEART RATE: 56 BPM | WEIGHT: 205 LBS | SYSTOLIC BLOOD PRESSURE: 148 MMHG | TEMPERATURE: 97.5 F | DIASTOLIC BLOOD PRESSURE: 83 MMHG | HEIGHT: 69 IN | BODY MASS INDEX: 30.36 KG/M2

## 2023-06-12 PROCEDURE — 99213 OFFICE O/P EST LOW 20 MIN: CPT | Mod: 25

## 2023-06-12 PROCEDURE — 96402 CHEMO HORMON ANTINEOPL SQ/IM: CPT

## 2023-06-12 NOTE — HISTORY OF PRESENT ILLNESS
[FreeTextEntry1] : Past Uro hx: 78 yo M referred for Elevated PSA level. s/p bilateral hip replacement/arthroplasties.has hx of cardiac arrhythmias, Atherosclerosis. HTN. HL. \par MRI 10/31/2022: performed showed 2 lesions PIRADS 4: PS=41g / -ve NV bundle involvement, - SV invasion, -LNopathy\par \par PSA 4.02 on 9/2022\par PSA 3.86 on 4/2020\par \par +family hx of prostate CA in father\par FU Jan 9, 2022: s/p TP fusion Bx: Gl 7 (3+4). high volume 50%-100% in both target lesions and left anterior bx. pattern 4 up to 40% involvement per core.\par IPSS 7/ ABILIO 13\par performance status: excellent per patient.\par \par FU Feb 2023: Seen by Dr Silva Rad Onc. \par June 2023: finished EBRT with DR silva, 1 month ago. / due today for 2nd and last eligard injection.

## 2023-06-12 NOTE — LETTER BODY
[Dear  ___] : Dear  [unfilled], [Consult Letter:] : I had the pleasure of evaluating your patient, [unfilled]. [Please see my note below.] : Please see my note below. [Consult Closing:] : Thank you very much for allowing me to participate in the care of this patient.  If you have any questions, please do not hesitate to contact me. [Sincerely,] : Sincerely, [FreeTextEntry3] : \par Gage Beltrán MD\par Urologic Oncology\par Robotic & Endoscopic urology\par Formerly [facility] [place]\par [facility]\par

## 2023-06-12 NOTE — ASSESSMENT
[FreeTextEntry1] : 76yo M referred for Elevated PSA level\par MRI 10/31/2022: performed showed 2 lesions PIRADS 4: PS=41g\par PSA 4.02 on 9/2022\par +family hx of prostate CA in father\par FU jan 9, 2022: s/p TP fusion Bx: Gl 7 (3+4). high volume 50%-100% in both target lesions and left anterior bx. pattern 4 up to 40% involvement per core.\par IPSS 7/ SHIM13\par Blood thinners: none\par FU Feb 2023: Seen by Dr Silva Rad Onc.\par June 2023: finished EBRT with Dr. silva, 1 month ago. / due today for 2nd and last eligard injection.\par \par Plan: Gl 7 (3+4) intermediate risk PCa\par last Eligard today / completed EBRT\par \par PSA september 2023\par RTC sept 2023\par

## 2023-07-14 ENCOUNTER — NON-APPOINTMENT (OUTPATIENT)
Age: 78
End: 2023-07-14

## 2023-07-14 ENCOUNTER — APPOINTMENT (OUTPATIENT)
Dept: CARDIOLOGY | Facility: CLINIC | Age: 78
End: 2023-07-14
Payer: MEDICARE

## 2023-07-14 VITALS
BODY MASS INDEX: 30.21 KG/M2 | OXYGEN SATURATION: 94 % | DIASTOLIC BLOOD PRESSURE: 68 MMHG | SYSTOLIC BLOOD PRESSURE: 118 MMHG | HEIGHT: 69 IN | HEART RATE: 55 BPM | WEIGHT: 204 LBS

## 2023-07-14 DIAGNOSIS — I71.40 ABDOMINAL AORTIC ANEURYSM, WITHOUT RUPTURE, UNSPECIFIED: ICD-10-CM

## 2023-07-14 PROCEDURE — 99213 OFFICE O/P EST LOW 20 MIN: CPT

## 2023-07-14 PROCEDURE — 93000 ELECTROCARDIOGRAM COMPLETE: CPT

## 2023-07-14 RX ORDER — AMOXICILLIN AND CLAVULANATE POTASSIUM 875; 125 MG/1; MG/1
875-125 TABLET, COATED ORAL
Qty: 6 | Refills: 0 | Status: DISCONTINUED | COMMUNITY
Start: 2023-02-07 | End: 2023-07-14

## 2023-07-14 NOTE — ASSESSMENT
[FreeTextEntry1] : ZENOBIA KEENE is a 77 year old M who presents today Jan 13, 2023 with the above history and the following active issues: \par \par Baseline abnormal EKG.\par Last stress 2019 no ischemia. \par Currently very active no angina\par Risk factor modification and preventive medical therapy. \par \par Hypertensive heart disease \par GIven enlarged aorta I encouraged stricter BP control,, has been well controlled Encouraged to decrease coffee intake and only do light weights and cardio\par low salt diet\par Cont bisoprolol/hctz\par \par TAA 4.3cm stable\par Borderline abdominal aortic dimensions prior 3.1cm normal dimension on today's US\par Aortic atherosclerosis \par Surveillance monitoring will be cont \par BP control. \par \par HLD\par LDL very well controlled at 55\par Continue statin\par Adjunctive diet and lifestyle modification measures reviewed. \par reduce carbs/sugars d/t borderline a1c\par \par Pt will call us if any prostate procedures are scheduled, would not anticipate a need for add'l CV testing unles new symptoms/change in clinical status\par Otherwise CV f/u in 6 mo\par Any questions and concerns were addressed and resolved. \par \par

## 2023-07-14 NOTE — HISTORY OF PRESENT ILLNESS
[FreeTextEntry1] : ZENOBIA KEENE  is a 77 year old  M is here for 6 month follow up. S/he denies chest pain, pressure, palpitations, unusual shortness of breath, orthopnea, LE edema, lightheadedness, dizziness, near syncope or syncope.\par Was diagnosised with prostate CA and just completed raditaion treatments.\par \par History of VHD, dilated ao, arthritis, hypothyroidism, long-standing hypertension, and hyperlipidemia.\par \par There is no prior history of a clinical myocardial infarction, coronary revascularization. \par There is no history of symptomatic congestive heart failure rheumatic heart disease \par There is no history of symptomatic arrhythmias including atrial fibrillation.\par \par He walks a couple miles.  He does core exercises.  \par Active at home gym: lightweights, aerobics\par There is no exertional chest pain, pressure or discomfort. \par There is no significant dyspnea on exertion or orthopnea. \par There are no symptomatic palpitations, lightheadedness, dizziness or syncope.\par \par EKG demonstrates sinus bradycardia with first-degree AV block left anterior fascicular block LVH and prior anteroseptal MI.  \par Echo Jan 2023 EF 60% min MR, mild to mod AI  unchanged, aortic root 4.3cm unchanged\par Abd sono Jan 2023 no AAA\par Last cholesterol LDL 55 A1c 6.1 hemoglobin 12.9 \par \par Past testing for reference: \par Echo 10/12/21 normal LV systolic function, EF 60%, mild MR, mild to mod AI, normal chamber dimensions\par Ao root 4.3cm, asc ao 4.5cm\par Abd ao 3.1cm, mild to mod atherosclerosis \par Carotid doppler 7/2020 mild nonobx atherosclerosis \par Nuclear stress test 5/19 no ischemia\par \par

## 2023-07-14 NOTE — REASON FOR VISIT
[Arrhythmia/ECG Abnorrmalities] : arrhythmia/ECG abnormalities [Structural Heart and Valve Disease] : structural heart and valve disease [Carotid, Aortic and Peripheral Vascular Disease] : carotid, aortic and peripheral vascular disease [Spouse] : spouse

## 2023-07-14 NOTE — PHYSICAL EXAM
[Well Developed] : well developed [Well Nourished] : well nourished [Normal S1, S2] : normal S1, S2 [Murmur] : murmur [Clear Lung Fields] : clear lung fields [Good Air Entry] : good air entry [Normal] : alert and oriented, normal memory [de-identified] : 2/6

## 2023-07-20 NOTE — H&P PST ADULT - PSYCHIATRIC
negative Affect and characteristics of appearance, verbalizations, behaviors are appropriate Eucrisa Counseling: Patient may experience a mild burning sensation during topical application. Eucrisa is not approved in children less than 2 years of age.

## 2023-09-15 ASSESSMENT — HOOS JR
BENDING TO THE FLOOR TO PICK UP OBJECT: EXTREME
IMPORTED FORM: YES
IMPORTED HOOS JR SCORE: 39.9
RISING FROM SITTING: SEVERE
GOING UP OR DOWN STAIRS: SEVERE
SITTING: MILD
BENDING TO THE FLOOR TO PICK UP OBJECT: EXTREME
LYING IN BED (TURNING OVER, MAINTAINING HIP POSITION): SEVERE
HOOS JR RAW SCORE: 16
IMPORTED LATERALITY: RIGHT
HOOS JR RAW SCORE: 0
WALKING ON UNEVEN SURFACE: MODERATE
RISING FROM SITTING: SEVERE
GOING UP OR DOWN STAIRS: SEVERE
IMPORTED HOOS JR SCORE: 100.0
HOOS JR RAW SCORE: 0
IMPORTED FORM: YES
HOOS JR RAW SCORE: 16
IMPORTED HOOS JR SCORE: 100.0
LYING IN BED (TURNING OVER, MAINTAINING HIP POSITION): SEVERE
IMPORTED LATERALITY: RIGHT
SITTING: MILD
IMPORTED HOOS JR SCORE: 39.9
WALKING ON UNEVEN SURFACE: MODERATE

## 2023-09-19 ENCOUNTER — APPOINTMENT (OUTPATIENT)
Dept: RADIATION ONCOLOGY | Facility: CLINIC | Age: 78
End: 2023-09-19
Payer: MEDICARE

## 2023-09-19 ENCOUNTER — NON-APPOINTMENT (OUTPATIENT)
Age: 78
End: 2023-09-19

## 2023-09-19 PROCEDURE — 99024 POSTOP FOLLOW-UP VISIT: CPT

## 2023-10-09 ENCOUNTER — APPOINTMENT (OUTPATIENT)
Dept: RADIOLOGY | Facility: CLINIC | Age: 78
End: 2023-10-09

## 2023-10-18 ENCOUNTER — NON-APPOINTMENT (OUTPATIENT)
Age: 78
End: 2023-10-18

## 2023-10-23 ENCOUNTER — APPOINTMENT (OUTPATIENT)
Dept: UROLOGY | Facility: CLINIC | Age: 78
End: 2023-10-23
Payer: MEDICARE

## 2023-10-23 VITALS
HEIGHT: 69 IN | SYSTOLIC BLOOD PRESSURE: 149 MMHG | WEIGHT: 205 LBS | TEMPERATURE: 97.3 F | BODY MASS INDEX: 30.36 KG/M2 | DIASTOLIC BLOOD PRESSURE: 87 MMHG | HEART RATE: 52 BPM

## 2023-10-23 PROCEDURE — 99213 OFFICE O/P EST LOW 20 MIN: CPT

## 2023-12-08 ENCOUNTER — NON-APPOINTMENT (OUTPATIENT)
Age: 78
End: 2023-12-08

## 2023-12-08 ENCOUNTER — APPOINTMENT (OUTPATIENT)
Dept: RADIATION ONCOLOGY | Facility: CLINIC | Age: 78
End: 2023-12-08
Payer: MEDICARE

## 2023-12-08 VITALS
HEIGHT: 69 IN | OXYGEN SATURATION: 96 % | HEART RATE: 61 BPM | RESPIRATION RATE: 16 BRPM | SYSTOLIC BLOOD PRESSURE: 133 MMHG | TEMPERATURE: 97 F | BODY MASS INDEX: 30.36 KG/M2 | DIASTOLIC BLOOD PRESSURE: 75 MMHG | WEIGHT: 205 LBS

## 2023-12-08 PROCEDURE — 99214 OFFICE O/P EST MOD 30 MIN: CPT

## 2023-12-14 NOTE — PATIENT PROFILE ADULT - NSPROMEDSHERBAL_GEN_A_NUR
12/14/2023         RE: Michael Mills  [address]  [place] [address]        Dear Colleague,    Thank you for referring your patient, Michael Mills, to the [facility]. Please see a copy of my visit note below.    Urology clinic   Hematuria               Chief Complaint:   Hematuria           Consult or Referral:     Michael Mills is a 55 year old male seen in consultation from Dr. Sagastume.         History of Present Illness:    Michael Mills is a very pleasant 55 year old male who presents with a history of gross  hematuria in the past 2-3 weeks.  He had a CT urogram which was unremarkable.  He does not report any similar episodes in the past but reports blood in the semen at times.  He also has some associated lower urinary tract symptoms including hesitancy and dribbling.  He has been having left groin and testicular pain in the past 2 weeks.  He denies any dysuria.     He is a non-smoker and does not report any family history of bladder cancer.    Here with his wife Lachelle          Past Medical History:     Past Medical History:   Diagnosis Date     CARDIOVASCULAR SCREENING; LDL GOAL LESS THAN 160 10/31/2010    6.4% 10 yr risk 2021     Diverticulitis of sigmoid colon 2019    also diverticula found on colonscopy in 8/2019     Hepatic hemangioma      Plantar warts 10/04/2013     Premature ejaculation      RA (rheumatoid arthritis) (H) 12/31/2014     SKIN SENSATION DISTURB 12/25/2006    rt  hand, cts  rt wrist      Snoring 10/04/2013     Varicose veins of lower extremities with complications 12/25/2006     Problem list name updated by automated process. Provider to review            Past Surgical History:     Past Surgical History:   Procedure Laterality Date     AS REPAIR  ANAL FISTULA,RECT ADV FLAP  2008    Dr. Blair Bishop     CARPAL TUNNEL RELEASE RT/LT  2019    both sides     COLONOSCOPY N/A 8/16/2019    Dea - repeat in 10 years     varicose vein left  side              Medications     Current Outpatient Medications   Medication     acetaminophen (TYLENOL) 500 MG tablet     albuterol (PROAIR HFA/PROVENTIL HFA/VENTOLIN HFA) 108 (90 Base) MCG/ACT inhaler     fluticasone (FLONASE) 50 MCG/ACT nasal spray     omeprazole (PRILOSEC) 20 MG DR capsule     PLAQUENIL 200 MG tablet     cyclobenzaprine (FLEXERIL) 10 MG tablet     ibuprofen (ADVIL/MOTRIN) 600 MG tablet     loratadine (CLARITIN) 10 MG tablet     predniSONE (DELTASONE) 20 MG tablet     No current facility-administered medications for this visit.            Family History:     Family History   Problem Relation Age of Onset     Thyroid Disease Mother         underactive      Hypertension Mother      Cerebrovascular Disease Father 67         of complications of stroke     Heart Disease Father      Family History Negative Sister         1     Cancer Brother         1- at age of 37-from cancer ?origin      Myocardial Infarction Brother 51     Family History Negative Maternal Grandmother      Myocardial Infarction Maternal Grandfather      Family History Negative Paternal Grandmother      Family History Negative Paternal Grandfather      Myocardial Infarction Maternal Uncle      Cancer - colorectal No family hx of      Prostate Cancer No family hx of             Social History:     Social History     Socioeconomic History     Marital status:      Spouse name: Lachelle     Number of children: 3     Years of education: Not on file     Highest education level: Not on file   Occupational History     Not on file   Tobacco Use     Smoking status: Never     Passive exposure: Never     Smokeless tobacco: Never   Vaping Use     Vaping Use: Never used   Substance and Sexual Activity     Alcohol use: Yes     Comment: socially, 4-5 drinks on the weekend     Drug use: No     Sexual activity: Yes     Partners: Female     Comment: wife hysterectomy   Other Topics Concern     Parent/sibling w/ CABG, MI or angioplasty  before 65F 55M? No   Social History Narrative     since 1988    Working full time, working for [employer]    3 sons    No pets     Social Determinants of Health     Financial Resource Strain: Unknown (10/9/2023)    Financial Resource Strain      Within the past 12 months, have you or your family members you live with been unable to get utilities (heat, electricity) when it was really needed?: Patient refused   Food Insecurity: Low Risk  (10/9/2023)    Food Insecurity      Within the past 12 months, did you worry that your food would run out before you got money to buy more?: No      Within the past 12 months, did the food you bought just not last and you didn t have money to get more?: Patient refused   Transportation Needs: Unknown (10/9/2023)    Transportation Needs      Within the past 12 months, has lack of transportation kept you from medical appointments, getting your medicines, non-medical meetings or appointments, work, or from getting things that you need?: Patient refused   Physical Activity: Insufficiently Active (4/3/2023)    Exercise Vital Sign      Days of Exercise per Week: 2 days      Minutes of Exercise per Session: 30 min   Stress: No Stress Concern Present (4/3/2023)    [employer] [place] - Occupational Stress Questionnaire      Feeling of Stress : Only a little   Social Connections: Moderately Integrated (4/3/2023)    Social Connection and Isolation Panel [NHANES]      Frequency of Communication with Friends and Family: Once a week      Frequency of Social Gatherings with Friends and Family: Never      Attends Yarsanism Services: More than 4 times per year      Active Member of Clubs or Organizations: Yes      Attends Club or Organization Meetings: Not on file      Marital Status:    Interpersonal Safety: Low Risk  (10/9/2023)    Interpersonal Safety      Do you feel physically and emotionally safe where you currently live?: Yes      Within the past 12  "months, have you been hit, slapped, kicked or otherwise physically hurt by someone?: No      Within the past 12 months, have you been humiliated or emotionally abused in other ways by your partner or ex-partner?: No   Housing Stability: Unknown (10/9/2023)    Housing Stability      Do you have housing? : Patient refused      Are you worried about losing your housing?: Patient refused            Allergies:   No known drug allergy         Review of Systems:  From intake questionnaire     Negative 14 system review except as noted on HPI, nurse's note.         Physical Exam:     Patient is a 55 year old  male    Vitals: Blood pressure 122/84, pulse 83, temperature 97.6  F (36.4  C), temperature source Oral, resp. rate 14, height 1.689 m (5' 6.5\"), weight 67.2 kg (148 lb 3.2 oz), SpO2 98%. Body mass index is 23.56 kg/m .  General Appearance Adult: Alert, no acute distress, oriented  HENT: throat/mouth:normal, good dentition  Lungs: no respiratory distress, or pursed lip breathing  Heart: No obvious jugular venous distension present  Abdomen: soft, nontender, no organomegaly or masses, scars noted  Lymphatics: No cervical or supraclavicular adenopathy  Musculoskeltal: extremities normal, no peripheral edema  Skin: no visible suspicious lesions or rashes  Neuro: Alert, oriented, speech and mentation normal  Psych: affect and mood normal  Gait: Normal  : deferred to cystoscopy        Labs and Pathology:    I reviewed all applicable laboratory and pathology data and went over findings with patient  Significant for     Lab Results   Component Value Date    WBC 9.1 09/02/2023    WBC 8.9 01/24/2020     Lab Results   Component Value Date    RBC 5.32 09/02/2023    RBC 5.63 01/24/2020     Lab Results   Component Value Date    HGB 16.3 09/02/2023    HGB 17.3 01/24/2020     Lab Results   Component Value Date    HCT 46.8 09/02/2023    HCT 52.0 01/24/2020     No components found for: \"MCT\"  Lab Results   Component Value Date    MCV " 88 09/02/2023    MCV 92 01/24/2020     Lab Results   Component Value Date    MCH 30.6 09/02/2023    MCH 30.7 01/24/2020     Lab Results   Component Value Date    MCHC 34.8 09/02/2023    MCHC 33.3 01/24/2020     Lab Results   Component Value Date    RDW 12.3 09/02/2023    RDW 12.7 01/24/2020     Lab Results   Component Value Date     09/02/2023     01/24/2020       Last Comprehensive Metabolic Panel:  Sodium   Date Value Ref Range Status   09/02/2023 135 (L) 136 - 145 mmol/L Final   01/24/2020 135 133 - 144 mmol/L Final     Potassium   Date Value Ref Range Status   09/02/2023 3.9 3.4 - 5.3 mmol/L Final   06/10/2022 4.3 3.4 - 5.3 mmol/L Final   01/24/2020 4.2 3.4 - 5.3 mmol/L Final     Chloride   Date Value Ref Range Status   09/02/2023 102 98 - 107 mmol/L Final   06/10/2022 106 94 - 109 mmol/L Final   01/24/2020 106 94 - 109 mmol/L Final     Carbon Dioxide   Date Value Ref Range Status   01/24/2020 26 20 - 32 mmol/L Final     Carbon Dioxide (CO2)   Date Value Ref Range Status   09/02/2023 21 (L) 22 - 29 mmol/L Final   06/10/2022 27 20 - 32 mmol/L Final     Anion Gap   Date Value Ref Range Status   09/02/2023 12 7 - 15 mmol/L Final   06/10/2022 4 3 - 14 mmol/L Final   01/24/2020 3 3 - 14 mmol/L Final     Glucose   Date Value Ref Range Status   09/02/2023 112 (H) 70 - 99 mg/dL Final   06/10/2022 86 70 - 99 mg/dL Final   01/24/2020 93 70 - 99 mg/dL Final     Urea Nitrogen   Date Value Ref Range Status   09/02/2023 11.6 6.0 - 20.0 mg/dL Final   06/10/2022 14 7 - 30 mg/dL Final   01/24/2020 12 7 - 30 mg/dL Final     Creatinine   Date Value Ref Range Status   09/02/2023 1.11 0.67 - 1.17 mg/dL Final   01/24/2020 0.97 0.66 - 1.25 mg/dL Final     GFR Estimate   Date Value Ref Range Status   09/02/2023 78 >60 mL/min/1.73m2 Final   01/24/2020 89 >60 mL/min/[1.73_m2] Final     Comment:     Non  GFR Calc  Starting 12/18/2018, serum creatinine based estimated GFR (eGFR) will be   calculated using the  "Chronic Kidney Disease Epidemiology Collaboration   (CKD-EPI) equation.     01/24/2020 78 >60 mL/min/[1.73_m2] Final     Calcium   Date Value Ref Range Status   09/02/2023 9.1 8.6 - 10.0 mg/dL Final   01/24/2020 8.9 8.5 - 10.1 mg/dL Final     Bilirubin Total   Date Value Ref Range Status   09/02/2023 0.7 <=1.2 mg/dL Final   08/26/2019 0.5 0.2 - 1.3 mg/dL Final     Alkaline Phosphatase   Date Value Ref Range Status   09/02/2023 67 40 - 129 U/L Final   08/26/2019 65 40 - 150 U/L Final     ALT   Date Value Ref Range Status   09/02/2023 32 0 - 70 U/L Final     Comment:     Reference intervals for this test were updated on 6/12/2023 to more accurately reflect our healthy population. There may be differences in the flagging of prior results with similar values performed with this method. Interpretation of those prior results can be made in the context of the updated reference intervals.     08/26/2019 20 0 - 70 U/L Final     AST   Date Value Ref Range Status   09/02/2023 33 0 - 45 U/L Final     Comment:     Reference intervals for this test were updated on 6/12/2023 to more accurately reflect our healthy population. There may be differences in the flagging of prior results with similar values performed with this method. Interpretation of those prior results can be made in the context of the updated reference intervals.   08/26/2019 19 0 - 45 U/L Final       INR/Prothrombin Time    Creatinine   Date Value Ref Range Status   09/02/2023 1.11 0.67 - 1.17 mg/dL Final   01/24/2020 0.97 0.66 - 1.25 mg/dL Final        No results found for: \"PSA\"        Imaging:    I reviewed all applicable imaging and went over findings with patient.  No evidence of hydronephrosis, filling defect, or retroperitoneal lymphadenopathy.    Cystoscopy procedure     After sterile preparation and draping of the patient,  a 16-English flexible cystoscope was introduced via the urethra.  It was passed without difficulty into the bladder.  The urethra was " open without evidence of stricture.  The ureteral orifices were orthotopic.  The bladder mucosa was evaluated using both antegrade and retroflex views and this showed no evidence of any lesions or trabeculation.  Scope was then removed and patient tolerated the procedure well.     10 total minutes was spent on cystoscopy procedure alone.                 Assessment and Plan:     Assessment     55-year-old male with gross hematuria and unremarkable workup  LUTS  Hematospermia    We discussed Flomax as an option to treat his lower urine tract symptoms.  I am hoping that could address his groin and testicular pain as well.  There could be a component of prostatitis.  He has never had a PSA done before so we will screen for prostate cancer as well.      Plan  Start Flomax  Order PSA  Follow-up in 2 to 3 months to reassess urinary symptoms  Follow-up with GUILLERMO's      45 total minutes spent on the date of the encounter including direct interaction with the patient, performing chart review, documentation and further activities as noted above, exclusive of the time spent on performing cystoscopy.         Clifton Brown MD   Department of Urology   [facility]       Nursing Note:  Michael Mills presents today for cystoscopy.  Patient seen by provider today: Yes: Dr. Brown.   present during visit today: Not Applicable.    Note: N/A.    Labs:  Not Applicable.     Procedure:  Urology Procedure: Cystoscopy.    Verified:  Not Applicable.       Post Procedure:  Patient tolerated the procedure without incident..    Post Pain Assessment: 3 out of 10.    Discharge Plan:   Departure Mode: Ambulatory.    Anel Milligan MA        Again, thank you for allowing me to participate in the care of your patient.        Sincerely,        Clifton Brown MD   no

## 2023-12-18 ENCOUNTER — APPOINTMENT (OUTPATIENT)
Dept: CARDIOLOGY | Facility: CLINIC | Age: 78
End: 2023-12-18
Payer: MEDICARE

## 2023-12-18 PROCEDURE — 93306 TTE W/DOPPLER COMPLETE: CPT

## 2024-01-05 ENCOUNTER — NON-APPOINTMENT (OUTPATIENT)
Age: 79
End: 2024-01-05

## 2024-01-05 ENCOUNTER — APPOINTMENT (OUTPATIENT)
Dept: OPHTHALMOLOGY | Facility: CLINIC | Age: 79
End: 2024-01-05
Payer: MEDICARE

## 2024-01-05 PROCEDURE — 92014 COMPRE OPH EXAM EST PT 1/>: CPT

## 2024-01-05 PROCEDURE — 92134 CPTRZ OPH DX IMG PST SGM RTA: CPT

## 2024-01-12 ENCOUNTER — APPOINTMENT (OUTPATIENT)
Dept: CARDIOLOGY | Facility: CLINIC | Age: 79
End: 2024-01-12

## 2024-02-02 ENCOUNTER — OUTPATIENT (OUTPATIENT)
Dept: OUTPATIENT SERVICES | Facility: HOSPITAL | Age: 79
LOS: 1 days | End: 2024-02-02

## 2024-02-02 DIAGNOSIS — Z96.651 PRESENCE OF RIGHT ARTIFICIAL KNEE JOINT: Chronic | ICD-10-CM

## 2024-02-02 DIAGNOSIS — Z96.649 PRESENCE OF UNSPECIFIED ARTIFICIAL HIP JOINT: Chronic | ICD-10-CM

## 2024-02-02 DIAGNOSIS — D69.6 THROMBOCYTOPENIA, UNSPECIFIED: ICD-10-CM

## 2024-02-02 DIAGNOSIS — Z98.890 OTHER SPECIFIED POSTPROCEDURAL STATES: Chronic | ICD-10-CM

## 2024-02-13 RX ORDER — ATORVASTATIN CALCIUM 20 MG/1
20 TABLET, FILM COATED ORAL
Qty: 90 | Refills: 3 | Status: ACTIVE | COMMUNITY
Start: 2024-02-13 | End: 1900-01-01

## 2024-02-21 ENCOUNTER — NON-APPOINTMENT (OUTPATIENT)
Age: 79
End: 2024-02-21

## 2024-02-21 ENCOUNTER — APPOINTMENT (OUTPATIENT)
Age: 79
End: 2024-02-21
Payer: MEDICARE

## 2024-02-21 VITALS
TEMPERATURE: 97.1 F | DIASTOLIC BLOOD PRESSURE: 73 MMHG | OXYGEN SATURATION: 97 % | WEIGHT: 208 LBS | SYSTOLIC BLOOD PRESSURE: 137 MMHG | BODY MASS INDEX: 30.81 KG/M2 | HEIGHT: 69 IN | HEART RATE: 55 BPM

## 2024-02-21 DIAGNOSIS — D64.9 ANEMIA, UNSPECIFIED: ICD-10-CM

## 2024-02-21 DIAGNOSIS — D69.6 THROMBOCYTOPENIA, UNSPECIFIED: ICD-10-CM

## 2024-02-21 PROCEDURE — 99204 OFFICE O/P NEW MOD 45 MIN: CPT

## 2024-02-21 NOTE — ADDENDUM
[FreeTextEntry1] : I, Yang Schrader, documented this note as a scribe on behalf of Dr. Joel Oliveira on 02/21/2024.

## 2024-02-21 NOTE — HISTORY OF PRESENT ILLNESS
[de-identified] : Mr. Charles had a bad right index finger infection with osteomyelitis leading to amputation with Dr. Villanueva in January, 2024. He saw Dr. Keane who placed him on long-term antibiotics, including linezolid. CBC from that time revealed hemoglobin 10.6 g, MCV 93.4. Platelets also 40, no clumps seen on smear.  Mean platelet volume 10.7.  No other CBC abnormalities detected.  Radiation for prostate cancer with Dr. Silva, finished EBRT in May, 2023, received 6 months of ADT.  PSA has been undetectable. Followed every 4 months, next visit in mid-April. Urologist in Dr. Beltrán. PCP is Dr. Perez.  Counts are recovered to 207 by February 16.    Patient is planned for repeat blood work tomorrow, 2/22/24. He denies any acute complaints today. Denies any abdominal pain or GI issues.  Family history: prostate cancer (father, grandfather) throat cancer (mother) No known FHx of anemia or blood disorders  Social history: Nonsmoker Social EtOH

## 2024-02-21 NOTE — REVIEW OF SYSTEMS
[Diarrhea: Grade 0] : Diarrhea: Grade 0 [Negative] : Allergic/Immunologic [Chest Pain] : no chest pain [Shortness Of Breath] : no shortness of breath [Abdominal Pain] : no abdominal pain [Joint Pain] : no joint pain [Joint Stiffness] : no joint stiffness

## 2024-02-21 NOTE — END OF VISIT
[FreeTextEntry3] : All medical record entries made by the Scribe were at my, Dr. Joel Oliveira, direction and personally dictated by me on 02/21/2024. I have reviewed the chart and agree that the record accurately reflects my personal performance of the history, physical exam, assessment and plan. I have also personally directed, reviewed, and agreed with the chart.

## 2024-02-21 NOTE — RESULTS/DATA
[FreeTextEntry1] : Mr. Charles is a pleasant 78 year-old man with recent osteomyelitis s/p right first finger amputation, complicated by anemia and thrombocytopenia.

## 2024-02-21 NOTE — PHYSICAL EXAM
[Normal] : affect appropriate [de-identified] : anicteric [de-identified] : right first finger amputation site c/d/i

## 2024-04-21 NOTE — PHYSICAL EXAM
[General Appearance - Well Developed] : well developed [General Appearance - Well Nourished] : well nourished [Normal Appearance] : normal appearance [Well Groomed] : well groomed [General Appearance - In No Acute Distress] : no acute distress [Prostate Tenderness] : the prostate was not tender [Abdomen Soft] : soft [No Prostate Nodules] : no prostate nodules [Prostate Size ___ gm] : prostate size [unfilled] gm [Edema] : no peripheral edema [] : no respiratory distress [Respiration, Rhythm And Depth] : normal respiratory rhythm and effort [Exaggerated Use Of Accessory Muscles For Inspiration] : no accessory muscle use [Oriented To Time, Place, And Person] : oriented to person, place, and time [Normal Station and Gait] : the gait and station were normal for the patient's age [No Focal Deficits] : no focal deficits

## 2024-04-22 ENCOUNTER — APPOINTMENT (OUTPATIENT)
Dept: UROLOGY | Facility: CLINIC | Age: 79
End: 2024-04-22
Payer: MEDICARE

## 2024-04-22 VITALS
SYSTOLIC BLOOD PRESSURE: 155 MMHG | DIASTOLIC BLOOD PRESSURE: 85 MMHG | HEART RATE: 56 BPM | BODY MASS INDEX: 30.36 KG/M2 | TEMPERATURE: 96.3 F | OXYGEN SATURATION: 97 % | HEIGHT: 69 IN | WEIGHT: 205 LBS | RESPIRATION RATE: 15 BRPM

## 2024-04-22 PROCEDURE — 99214 OFFICE O/P EST MOD 30 MIN: CPT

## 2024-04-22 NOTE — ASSESSMENT
[FreeTextEntry1] : June 2023: finished EBRT with DR Silva, 1 month ago. / due today for 2nd and last eligard injection. October 2023: PSA<0.04 - has completed EBRT + 6 months of ADT (2 injections). has urinary frequency. office PVR=17 mL.  April 2024: PSA<0.04 / patient testosterone is still castrate level - patient has sx of Low T: weakness, mood changes, low libido, sleepiness, fatigue - discussed TRT with patient and most recent [facility] publication of safety of TRT - asked patient to discuss with Dr Silva  plan: s/o completion of EBRT + ADT for interm risk PCa Will get back to me regarding TRT all risks benefits explained will need PSA 6 months TBD with Dr Silva

## 2024-04-22 NOTE — LETTER BODY
[Dear  ___] : Dear  [unfilled], [Consult Letter:] : I had the pleasure of evaluating your patient, [unfilled]. [Please see my note below.] : Please see my note below. [Consult Closing:] : Thank you very much for allowing me to participate in the care of this patient.  If you have any questions, please do not hesitate to contact me. [Sincerely,] : Sincerely, [FreeTextEntry3] : \par  Gage Beltrán MD\par  Urologic Oncology\par  Robotic & Endoscopic urology\par  [facility] [place]\par  [facility]\par

## 2024-04-22 NOTE — HISTORY OF PRESENT ILLNESS
[FreeTextEntry1] : Past Uro Hx: 79 yo M referred for Elevated PSA level. s/p bilateral hip replacement/arthroplasties. has hx of cardiac arrhythmias, Atherosclerosis. HTN. HL.  MRI 10/31/2022: performed showed 2 lesions PIRADS 4: PS=41g / -ve NV bundle involvement, - SV invasion, -LNopathy PSA 4.02 on 9/2022 PSA 3.86 on 4/2020 +family hx of prostate CA in father FU Jan 9, 2022: s/p TP fusion Bx: Gl 7 (3+4). high volume 50%-100% in both target lesions and left anterior bx. pattern 4 up to 40% involvement per core. IPSS 7/ ABILIO 13 performance status: excellent per patient.  FU Feb 2023: Seen by Dr Silva Rad Onc.  June 2023: finished EBRT with DR Silva, 1 month ago. / due today for 2nd and last eligard injection. October 2023: PSA<0.04 - has completed EBRT + 6 months of ADT (2 injections). has urinary frequency. office PVR=17 mL.  April 2024: PSA<0.04 / patient testosterone is still castrate level - patient has sx of Low T: weakness, mood changes, low libido, sleepiness, fatigue - discussed TRT with patient and most recent [facility] publication of safety of TRT - asked patient to discuss with Dr Silva

## 2024-04-23 ENCOUNTER — NON-APPOINTMENT (OUTPATIENT)
Age: 79
End: 2024-04-23

## 2024-04-23 ENCOUNTER — APPOINTMENT (OUTPATIENT)
Dept: CARDIOLOGY | Facility: CLINIC | Age: 79
End: 2024-04-23
Payer: MEDICARE

## 2024-04-23 VITALS
HEART RATE: 55 BPM | SYSTOLIC BLOOD PRESSURE: 140 MMHG | HEIGHT: 69 IN | OXYGEN SATURATION: 95 % | DIASTOLIC BLOOD PRESSURE: 80 MMHG | BODY MASS INDEX: 31.1 KG/M2 | WEIGHT: 210 LBS

## 2024-04-23 DIAGNOSIS — I71.20 THORACIC AORTIC ANEURYSM, WITHOUT RUPTURE, UNSPECIFIED: ICD-10-CM

## 2024-04-23 DIAGNOSIS — E78.5 HYPERLIPIDEMIA, UNSPECIFIED: ICD-10-CM

## 2024-04-23 DIAGNOSIS — R94.31 ABNORMAL ELECTROCARDIOGRAM [ECG] [EKG]: ICD-10-CM

## 2024-04-23 DIAGNOSIS — I10 ESSENTIAL (PRIMARY) HYPERTENSION: ICD-10-CM

## 2024-04-23 DIAGNOSIS — I35.1 NONRHEUMATIC AORTIC (VALVE) INSUFFICIENCY: ICD-10-CM

## 2024-04-23 PROCEDURE — 99214 OFFICE O/P EST MOD 30 MIN: CPT

## 2024-04-23 PROCEDURE — 93000 ELECTROCARDIOGRAM COMPLETE: CPT

## 2024-04-23 RX ORDER — CEPHALEXIN 500 MG/1
500 CAPSULE ORAL
Qty: 28 | Refills: 0 | Status: DISCONTINUED | COMMUNITY
Start: 2024-03-04

## 2024-04-23 RX ORDER — CIPROFLOXACIN HYDROCHLORIDE 500 MG/1
500 TABLET, FILM COATED ORAL
Qty: 14 | Refills: 0 | Status: DISCONTINUED | COMMUNITY
Start: 2024-03-04

## 2024-04-24 NOTE — HISTORY OF PRESENT ILLNESS
[FreeTextEntry1] : ZENOBIA KEENE  is a 78 year old  M  History of VHD, dilated ao, arthritis, hypothyroidism, long-standing hypertension, and hyperlipidemia.  There is no prior history of a clinical myocardial infarction, coronary revascularization.  There is no history of symptomatic congestive heart failure rheumatic heart disease  There is no history of symptomatic arrhythmias including atrial fibrillation.   Active at home gym: lightweights, aerobics There is no exertional chest pain, pressure or discomfort.  There is no significant dyspnea on exertion or orthopnea.  There are no symptomatic palpitations, lightheadedness, dizziness or syncope.  Last seen in the office over a year ago. He completed XRT for prostate cancer with undetectable PSA.   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   He is returning to his usual gym program and slowly building back his exercise tolerance.   There was thrombocytopenia related to antibiotics.    a prior abdominal ultrasound January 2023 had no aneurysm with aortic atherosclerosis max dimension 2.7 cm  last stress test was approximately 5 years ago  last echocardiogram December 2023 has normal left ventricular function aortic root 4.1 cm ascending aorta 4.6 cm moderate aortic regurgitation   today's EKG has sinus bradycardia left anterior fascicular block LVH poor R wave progression nonspecific ST changes Last LDL 73 A1c 5.9 TSH 2.9.

## 2024-04-24 NOTE — ASSESSMENT
[FreeTextEntry1] : EKG and recent cardiovascular testing has been reviewed Continue statin and antihypertensive therapy.   Rule out ischemic heart disease.   There is baseline abnormal EKG.  There are multiple coronary risk factors.  There has been no recent ischemic evaluation.   Plan for exercise nuclear stress test off bisoprolol.  follow-up echocardiogram to monitor aortic dilatation and aortic regurgitation   Clinical follow-up will be scheduled at the requested stress test.  TAA Hypertensive heart disease Baseline abnormal EKG. Aortic atherosclerosis HL Last stress 2019 no ischemia. Risk factor modification and preventive medical therapy. Cont bisoprolol/hctz Adjunctive diet and lifestyle modification measures reviewed. Any questions and concerns were addressed and resolved.

## 2024-04-30 ENCOUNTER — NON-APPOINTMENT (OUTPATIENT)
Age: 79
End: 2024-04-30

## 2024-05-01 ENCOUNTER — NON-APPOINTMENT (OUTPATIENT)
Age: 79
End: 2024-05-01

## 2024-05-01 ENCOUNTER — APPOINTMENT (OUTPATIENT)
Dept: RADIATION ONCOLOGY | Facility: CLINIC | Age: 79
End: 2024-05-01
Payer: MEDICARE

## 2024-05-01 VITALS
BODY MASS INDEX: 30.51 KG/M2 | WEIGHT: 206 LBS | HEART RATE: 60 BPM | HEIGHT: 69 IN | TEMPERATURE: 97 F | SYSTOLIC BLOOD PRESSURE: 158 MMHG | OXYGEN SATURATION: 96 % | RESPIRATION RATE: 16 BRPM | DIASTOLIC BLOOD PRESSURE: 84 MMHG

## 2024-05-01 DIAGNOSIS — C61 MALIGNANT NEOPLASM OF PROSTATE: ICD-10-CM

## 2024-05-01 PROCEDURE — 99214 OFFICE O/P EST MOD 30 MIN: CPT

## 2024-05-01 NOTE — DISEASE MANAGEMENT
[RadiationCompletedDate] : 05/23 [EBRTDose] : 70 [EBRTFractions] : 28 [ADTStartedDate] : 02/23 [ADTDuration] : 6 months [ADTCompletedDate] : 09/23 [FreeTextEntry4] : adenocarcinoma of the prostate [TTNM] : 2c [MTNM] : 0 [NTNM] : 0

## 2024-05-01 NOTE — PHYSICAL EXAM
[Extraocular Movements] : extraocular movements were intact [Outer Ear] : the ears and nose were normal in appearance [Bowel Sounds] : normal bowel sounds [Abdomen Soft] : soft [Nondistended] : nondistended [Abdomen Tenderness] : non-tender [Normal] : no focal deficits

## 2024-05-01 NOTE — HISTORY OF PRESENT ILLNESS
[FreeTextEntry1] : 78 year old gentleman with prostate cancer, status post moderate hypofractionated radiation completed 5/2023 with short course ADT.   Received 2 4-month Eligard injections in 2/2023 and 6/2023.  Last IPSS 18 EPIC 23; Today IPSS 6  EPIC 11. Reports nocturia x 2, slight urgency and ED. No dysuria, hesitancy, dribbling, hematuria, change in bowel, bony pain, fatigue or weight loss. Has started working out in the gym, and starting to feel improved energy.  However, continues to have decreased libido.  9/11/23 PSA <0.04 ng/mL 12/1/23 PSA <0.04 ng/ml 4/15/24 PSA < 0.04 ng/ml testosterone <10L   Care team: PCP [facility] Joel Oliveira

## 2024-05-01 NOTE — PATIENT PROFILE ADULT - NSSCCAGEALCOHOLGUILTYABOUT_GEN_A_NUR
Call returned to patient and notified that they had seen  PCP 3-13-24 for annual/wellness visit. Voiced they received notice from medicare which is new insurance for patient. Advised to patient to contact carrier if an in home visit from them is necessary or if annual visit information from PCP be faxed. Patient agreed to contact clinic if scheduling an appointment of faxing information is necessary. Verbalized understanding and thank you.    no

## 2024-05-01 NOTE — REVIEW OF SYSTEMS
[Nocturia] : nocturia [IPSS Score (0-40): ___] : IPSS score: [unfilled] [EPIC-CP Score (0-60): ___] : EPIC-CP score: [unfilled] [Negative] : Allergic/Immunologic [Constipation: Grade 0] : Constipation: Grade 0 [Diarrhea: Grade 0] : Diarrhea: Grade 0 [Hematuria: Grade 0] : Hematuria: Grade 0 [Urinary Incontinence: Grade 0] : Urinary Incontinence: Grade 0  [Urinary Retention: Grade 0] : Urinary Retention: Grade 0 [Urinary Tract Pain: Grade 0] : Urinary Tract Pain: Grade 0 [Urinary Urgency: Grade 1 - Present] : Urinary Urgency: Grade 1 - Present [Erectile Dysfunction: Grade 2 - Decrease in erectile function (frequency/rigidity of erections), erectile intervention indicated, (e.g., medication or mechanical devices such as penile pump)] : Erectile Dysfunction: Grade 2 - Decrease in erectile function (frequency/rigidity of erections), erectile intervention indicated, (e.g., medication or mechanical devices such as penile pump) [Ejaculation Disorder: Grade 2 - Anejaculation or retrograde ejaculation] : Ejaculation Disorder: Grade 2 - Anejaculation or retrograde ejaculation

## 2024-05-23 RX ORDER — TAMSULOSIN HYDROCHLORIDE 0.4 MG/1
0.4 CAPSULE ORAL
Qty: 30 | Refills: 2 | Status: DISCONTINUED | COMMUNITY
Start: 2023-07-13 | End: 2024-05-23

## 2024-05-23 RX ORDER — TAMSULOSIN HYDROCHLORIDE 0.4 MG/1
0.4 CAPSULE ORAL
Qty: 30 | Refills: 5 | Status: ACTIVE | COMMUNITY
Start: 2023-04-13 | End: 1900-01-01

## 2024-07-11 ENCOUNTER — APPOINTMENT (OUTPATIENT)
Dept: CARDIOLOGY | Facility: CLINIC | Age: 79
End: 2024-07-11
Payer: MEDICARE

## 2024-07-11 PROCEDURE — A9502: CPT

## 2024-07-11 PROCEDURE — 93015 CV STRESS TEST SUPVJ I&R: CPT

## 2024-07-11 PROCEDURE — 78452 HT MUSCLE IMAGE SPECT MULT: CPT

## 2024-07-13 LAB — HBA1C MFR BLD HPLC: 5.9

## 2024-07-16 ENCOUNTER — APPOINTMENT (OUTPATIENT)
Dept: CARDIOLOGY | Facility: CLINIC | Age: 79
End: 2024-07-16
Payer: MEDICARE

## 2024-07-16 VITALS — DIASTOLIC BLOOD PRESSURE: 64 MMHG | SYSTOLIC BLOOD PRESSURE: 112 MMHG

## 2024-07-16 VITALS — WEIGHT: 204 LBS | BODY MASS INDEX: 30.13 KG/M2 | OXYGEN SATURATION: 97 % | HEART RATE: 61 BPM

## 2024-07-16 DIAGNOSIS — R94.31 ABNORMAL ELECTROCARDIOGRAM [ECG] [EKG]: ICD-10-CM

## 2024-07-16 DIAGNOSIS — I71.20 THORACIC AORTIC ANEURYSM, WITHOUT RUPTURE, UNSPECIFIED: ICD-10-CM

## 2024-07-16 DIAGNOSIS — R94.39 ABNORMAL RESULT OF OTHER CARDIOVASCULAR FUNCTION STUDY: ICD-10-CM

## 2024-07-16 DIAGNOSIS — E78.5 HYPERLIPIDEMIA, UNSPECIFIED: ICD-10-CM

## 2024-07-16 DIAGNOSIS — I10 ESSENTIAL (PRIMARY) HYPERTENSION: ICD-10-CM

## 2024-07-16 PROCEDURE — 99214 OFFICE O/P EST MOD 30 MIN: CPT

## 2024-07-16 RX ORDER — KRILL/OM-3/DHA/EPA/PHOSPHO/AST 1000-230MG
81 CAPSULE ORAL DAILY
Refills: 0 | Status: ACTIVE | COMMUNITY
Start: 2024-07-16

## 2024-08-12 NOTE — H&P PST ADULT - ANESTHESIA, PREVIOUS REACTION, PROFILE
Patient is in severe pain is returning call again to nurse-will most likely proceed to ER if unable to speak to nurse.   none

## 2024-08-21 ENCOUNTER — NON-APPOINTMENT (OUTPATIENT)
Age: 79
End: 2024-08-21

## 2024-08-21 ENCOUNTER — RESULT REVIEW (OUTPATIENT)
Age: 79
End: 2024-08-21

## 2024-08-30 ENCOUNTER — APPOINTMENT (OUTPATIENT)
Dept: CARDIOLOGY | Facility: CLINIC | Age: 79
End: 2024-08-30
Payer: MEDICARE

## 2024-08-30 VITALS
SYSTOLIC BLOOD PRESSURE: 140 MMHG | HEART RATE: 63 BPM | OXYGEN SATURATION: 97 % | BODY MASS INDEX: 31.4 KG/M2 | HEIGHT: 69 IN | DIASTOLIC BLOOD PRESSURE: 82 MMHG | WEIGHT: 212 LBS

## 2024-08-30 DIAGNOSIS — R94.39 ABNORMAL RESULT OF OTHER CARDIOVASCULAR FUNCTION STUDY: ICD-10-CM

## 2024-08-30 DIAGNOSIS — R94.31 ABNORMAL ELECTROCARDIOGRAM [ECG] [EKG]: ICD-10-CM

## 2024-08-30 DIAGNOSIS — I71.20 THORACIC AORTIC ANEURYSM, WITHOUT RUPTURE, UNSPECIFIED: ICD-10-CM

## 2024-08-30 DIAGNOSIS — E78.5 HYPERLIPIDEMIA, UNSPECIFIED: ICD-10-CM

## 2024-08-30 DIAGNOSIS — I10 ESSENTIAL (PRIMARY) HYPERTENSION: ICD-10-CM

## 2024-08-30 DIAGNOSIS — I25.10 ATHEROSCLEROTIC HEART DISEASE OF NATIVE CORONARY ARTERY W/OUT ANGINA PECTORIS: ICD-10-CM

## 2024-08-30 DIAGNOSIS — I35.1 NONRHEUMATIC AORTIC (VALVE) INSUFFICIENCY: ICD-10-CM

## 2024-08-30 PROCEDURE — 99215 OFFICE O/P EST HI 40 MIN: CPT

## 2024-08-31 DIAGNOSIS — N28.89 OTHER SPECIFIED DISORDERS OF KIDNEY AND URETER: ICD-10-CM

## 2024-09-03 ENCOUNTER — NON-APPOINTMENT (OUTPATIENT)
Age: 79
End: 2024-09-03

## 2024-09-03 NOTE — ADDENDUM
[FreeTextEntry1] : Please note the patient was reviewed with the NP. I was physically present during the service of the patient I was directly involved in the management plan and recommendations of care provided to the patient.  I personally reviewed the history and physical exam and plan as documented by the NP above.  Eric Kirby DO, FACC, [facility] Cardiologist

## 2024-09-03 NOTE — ASSESSMENT
[FreeTextEntry1] : ZENOBIA KEENE is a 78 year old M who presents today Aug 31, 2024 with the above history and the following active issues:  Abnormal stress test.  CAD noted on CCTA, severe CAC with obstructive LAD lesion He is asymptomatic for this Unfortunately noncardiac findings notable for lung cysts and 7.2x7.5cm R renal lesion.  I tasked the report to his urologist Dr. Beltrán and radiation oncologist Dr. Silva as well as provided a written copy to patient who verbalized understanding of importance of followup.  My office will call to assist in expediting appt with urology next week for further eval of renal lesion. Patient will also require cardiac catheterization. Ideally uro eval first to decide if pt will require any biopsy or surgical intervention which would be difficult if pt required PCI and antiplatelet therapy.  Discussed the risks, benefits, alternatives of invasive angiography. Discussed potential for revascularization, percutaneous v surgical Patient will continue ASA 81mg, bisoprolol, and increase his atorvastatin to 40mg daily.  ER precautions reviewed if any CP especially >10 mins.   TAA, mild on CT, stable Hypertensive heart disease Baseline abnormal EKG. Aortic atherosclerosis HL Cont bisoprolol/hctz Optimize statin Adjunctive diet and lifestyle modification measures reviewed.  Any questions and concerns by pt/wife were addressed and resolved.  Sincerely,  ISATU Baker Patients history, testing, and plan reviewed with supervising MD: Dr. Eric Kirby

## 2024-09-03 NOTE — HISTORY OF PRESENT ILLNESS
[FreeTextEntry1] : ZENOBIA KEENE  is a 78 year old  M  History of VHD, dilated ao, arthritis, hypothyroidism, long-standing hypertension, and hyperlipidemia.  There is no prior history of a clinical myocardial infarction, coronary revascularization. There is no history of symptomatic congestive heart failure rheumatic heart disease There is no history of symptomatic arrhythmias including atrial fibrillation.  Active at home gym: lightweights, aerobics There is no exertional chest pain, pressure or discomfort. There is no significant dyspnea on exertion or orthopnea. There are no symptomatic palpitations, lightheadedness, dizziness or syncope.  He completed XRT for prostate cancer with undetectable PSA. 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 He is returning to his usual gym program and slowly building back his exercise tolerance. His progress has slowed d/t ankle injury. There was thrombocytopenia related to antibiotics.  A nuclear stress test was performed recently. There are apical defects. Possible ischemia.  Cardiac CTA 8/2024 shows a calcium score of 2529 with >70% mLAD lesions +FFR suggestive of hemodynamically significant ischemia. Aorta was measured 4.2cm.There were also lung cysts and a 7.2x7.5cm R renal lesion.   a prior abdominal ultrasound January 2023 had no aneurysm with aortic atherosclerosis max dimension 2.7 cm last echocardiogram December 2023 has normal left ventricular function aortic root 4.1 cm ascending aorta 4.6 cm moderate aortic regurgitation EKG has sinus bradycardia left anterior fascicular block LVH poor R wave progression nonspecific ST changes Last LDL 73 A1c 5.9 TSH 2.9.

## 2024-09-03 NOTE — ADDENDUM
[FreeTextEntry1] : Please note the patient was reviewed with the NP. I was physically present during the service of the patient I was directly involved in the management plan and recommendations of care provided to the patient.  I personally reviewed the history and physical exam and plan as documented by the NP above.  Eric Kirby DO, FACC, [facility] Cardiologist

## 2024-09-03 NOTE — ADDENDUM
[FreeTextEntry1] : Please note the patient was reviewed with the NP. I was physically present during the service of the patient I was directly involved in the management plan and recommendations of care provided to the patient.  I personally reviewed the history and physical exam and plan as documented by the NP above.  Eric Kirby DO, FACC, [facility] Cardiologist

## 2024-09-09 ENCOUNTER — APPOINTMENT (OUTPATIENT)
Dept: CT IMAGING | Facility: CLINIC | Age: 79
End: 2024-09-09
Payer: MEDICARE

## 2024-09-09 PROCEDURE — 74178 CT ABD&PLV WO CNTR FLWD CNTR: CPT | Mod: MH

## 2024-09-12 ENCOUNTER — NON-APPOINTMENT (OUTPATIENT)
Age: 79
End: 2024-09-12

## 2024-09-13 ENCOUNTER — APPOINTMENT (OUTPATIENT)
Dept: CARDIOLOGY | Facility: CLINIC | Age: 79
End: 2024-09-13
Payer: MEDICARE

## 2024-09-13 VITALS
SYSTOLIC BLOOD PRESSURE: 138 MMHG | WEIGHT: 205 LBS | BODY MASS INDEX: 30.36 KG/M2 | OXYGEN SATURATION: 98 % | HEART RATE: 55 BPM | DIASTOLIC BLOOD PRESSURE: 80 MMHG | HEIGHT: 69 IN

## 2024-09-13 DIAGNOSIS — E78.5 HYPERLIPIDEMIA, UNSPECIFIED: ICD-10-CM

## 2024-09-13 DIAGNOSIS — I25.10 ATHEROSCLEROTIC HEART DISEASE OF NATIVE CORONARY ARTERY W/OUT ANGINA PECTORIS: ICD-10-CM

## 2024-09-13 DIAGNOSIS — R94.39 ABNORMAL RESULT OF OTHER CARDIOVASCULAR FUNCTION STUDY: ICD-10-CM

## 2024-09-13 DIAGNOSIS — I10 ESSENTIAL (PRIMARY) HYPERTENSION: ICD-10-CM

## 2024-09-13 DIAGNOSIS — I71.20 THORACIC AORTIC ANEURYSM, WITHOUT RUPTURE, UNSPECIFIED: ICD-10-CM

## 2024-09-13 DIAGNOSIS — I35.1 NONRHEUMATIC AORTIC (VALVE) INSUFFICIENCY: ICD-10-CM

## 2024-09-13 PROCEDURE — 99214 OFFICE O/P EST MOD 30 MIN: CPT

## 2024-09-13 NOTE — HISTORY OF PRESENT ILLNESS
[FreeTextEntry1] : ZENOBIA KEENE  is a 78 year old  M Recent data has been reviewed.  The case has been discussed with urology.  There is no plan for immediate intervention.  The CT scan describes a large renal cyst monitor valvular heart disease.  Monitor aortic dimensions.  FFR LAD 0.63 severe coronary artery calcification mid LAD greater than 70% stenosis he has been less active due to musculoskeletal injury.  Urology follow-up scheduled next week but told there is no plan for procedure at this time.  Plan cardiac catheterization.  Will discuss with interventional cardiology. History of VHD, dilated ao, arthritis, hypothyroidism, long-standing hypertension, and hyperlipidemia.  He completed XRT for prostate cancer with undetectable PSA. 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 He is returning to his usual gym program and slowly building back his exercise tolerance. His progress has slowed d/t ankle injury. There was thrombocytopenia related to antibiotics.  nuclear stress test was performed recently. There are apical defects. Possible ischemia.  Cardiac CTA 8/2024 shows a calcium score of 2529 with >70% mLAD lesions +FFR suggestive of hemodynamically significant ischemia. Aorta was measured 4.2cm.There were also lung cysts and a 7.2x7.5cm R renal lesion.   a prior abdominal ultrasound January 2023 had no aneurysm with aortic atherosclerosis max dimension 2.7 cm last echocardiogram December 2023 has normal left ventricular function aortic root 4.1 cm ascending aorta 4.6 cm moderate aortic regurgitation EKG has sinus bradycardia left anterior fascicular block LVH poor R wave progression nonspecific ST changes Last LDL 73 A1c 5.9 TSH 2.9.  Abnormal stress test.  CAD noted on CCTA, severe CAC with obstructive LAD lesion R renal lesion.   Discussed the risks, benefits, alternatives of invasive angiography. Discussed potential for revascularization, percutaneous v surgical  TAA, mild on CT, stable Hypertensive heart disease Baseline abnormal EKG. Aortic atherosclerosis HL Cont bisoprolol/hctz Optimize statin Adjunctive diet and lifestyle modification measures reviewed.  Any questions and concerns by pt/wife were addressed and resolved.

## 2024-09-16 ENCOUNTER — NON-APPOINTMENT (OUTPATIENT)
Age: 79
End: 2024-09-16

## 2024-09-24 ENCOUNTER — RESULT REVIEW (OUTPATIENT)
Age: 79
End: 2024-09-24

## 2024-09-25 NOTE — H&P PST ADULT - TOBACCO USE
1940: [employer] called for continued low BP. Pt sleeping currently. Making urine. Received 1 unit of blood during day shift. Day RN paged MD and told to monitor at that time. Await new orders.    Former smoker

## 2024-09-25 NOTE — H&P PST ADULT - NSICDXPASTMEDICALHX_GEN_ALL_CORE_FT
PAST MEDICAL HISTORY:  High cholesterol     Hypertension     Hypothyroidism      PAST MEDICAL HISTORY:  High cholesterol     Hypertension     Hypothyroidism     Prostate cancer

## 2024-09-25 NOTE — H&P PST ADULT - HISTORY OF PRESENT ILLNESS
Narrative: 78M PMH prostate CA s/p XRT, femur fractural, hand infection sp amputation, large renal cyst, valvular heart disease, dilated aorta, arthritis, hypothyroidism, HTN, HLD now presents after [facility] with severe coronary artery calcification of the mid LAD >70% stenosis. 7/11/24 NST with apical defects, possible ischemia. CCTA 8/2024 with Calcium score of 2529 with >70% mLAD lesions + FFR suggestive of hemodynamically significant ischemia. [facility] 9/24/24 via RRA which showed mLAD 90% heavily calcified lesion, LVEDP 14mmHg. Presents for rotational atherectomy and PCI of mid LAD with Dr. Tejeda on Thursday, 9/26/24.    Review of Systems: negative unless mentioned in HPI    Symptoms:        Angina (Class):        Ischemic Symptoms:     Heart Failure: N/A       Systolic/Diastolic/Combined:        NYHA Class (within 2 weeks):     Assessment of LVEF (Must be within 6 months):       EF: 55%       Assessed by: TTE       Date: 9/24/2024    Prior Cardiac Interventions (LHC, stents, CABG): [facility] 9/24/24 via RRA which showed mLAD 90% heavily calcified lesion, LVEDP 14mmHg.       PCI's (Date, Stents, Vessels):        CABG (Date, Grafts):     Stress Test (Date, Findings): 7/11/24 NST with small-sized, mild-mod defects in apical inferolateral and apical septal walls that are reversible suggestive of mild ischemia vs. attenuation from unable to raise arms - poor study    CCTA 8/2024 with Calcium score of 2529 with >70% mLAD lesions + FFR suggestive of hemodynamically significant ischemia.    Echo (Date, Findings): 9/24/24 TTE EF 55%, normal diastolic function, normal RV systolic fxn, dilated aortic root at sinuses of Valsalva, dilated asc aorta, mild-mod eccentric AI, no evidence of pHTN, no pericardial effusion    Antianginal Therapies:        Beta Blockers:  Bisoprolol 10mg po qd       Calcium Channel Blockers:        Long Acting Nitrates:        Ranexa:     Associated Risk Factors:        Frailty Score: (N/A, mild, moderate, severe)       Cerebrovascular Disease: N/A       Chronic Lung Disease: N/A       Peripheral Arterial Disease: N/A       Chronic Kidney Disease (if yes, what is GFR): N/A       Uncontrolled Diabetes (if yes, what is HgbA1C or FBS): N/A       Poorly Controlled Hypertension (if yes, what is SBP): N/A       Morbid Obesity (if yes, what is BMI): N/A       History of Recent Ventricular Arrhythmia: N/A       Inability to Ambulate Safely: N/A       Need for Therapeutic Anticoagulation: N/A       Antiplatelet or Contrast Allergy: N/A    PHYSICAL EXAM:  Constitutional: A & O x 3, NAD  HEENT:  Normal oral mucosa, PERRL, EOMI	  Cardiovascular: S1 S2, III/VI *** murmur, No JVD  Respiratory: Lungs clear to auscultation	  Gastrointestinal:  Soft, Non-tender, + BS	  Skin: No rashes or cyanosis  Neurologic: No deficit appreciated  Extremities: Normal range of motion, *** edema  Vascular: distal pulses +     Risk Stratification:  ASA: 3  Mallampati:   Bleeding Risk:   Creatinine:   GFR:   Pt assessed, appropriate for sedation, pt educated regarding the plan for Versed/Fentanyl as needed.    Plan/Recommendations:   -plan for ***  -preferred access: RRA ***  -patient seen and examined  -confirmed appropriate NPO duration  -ECG and Labs reviewed  -Aspirin 81mg po pre-cath ***  -NS 250mL IV bolus pre-cath ***  -procedure discussed with patient; risks and benefits explained, questions answered  -consent obtained by attending IC    Risks, benefits, and alternatives reviewed.  Risks including but not limited to MI, death, stroke, bleeding, infection, vessel injury, hematoma, renal failure, allergic reaction, urgent open heart surgery, restenosis and stent thrombosis were reviewed.  All questions answered.  Patient is agreeable to proceed.    ***RECENT LABS DONE -- WILL FIND ON HIE*** Narrative: 78M PMH prostate CA s/p XRT, femur fractural, hand infection sp amputation, large renal cyst, valvular heart disease, dilated aorta, arthritis, hypothyroidism, HTN, HLD now presents after [facility] with severe coronary artery calcification of the mid LAD >70% stenosis. 7/11/24 NST with apical defects, possible ischemia. CCTA 8/2024 with Calcium score of 2529 with >70% mLAD lesions + FFR suggestive of hemodynamically significant ischemia. [facility] 9/24/24 via RRA which showed mLAD 90% heavily calcified lesion, LVEDP 14mmHg. Presents for rotational atherectomy and PCI of mid LAD with Dr. Tejeda on Thursday, 9/26/24.    Review of Systems: negative unless mentioned in HPI    Symptoms:        Angina (Class):        Ischemic Symptoms:     Heart Failure: N/A       Systolic/Diastolic/Combined:        NYHA Class (within 2 weeks):     Assessment of LVEF (Must be within 6 months):       EF: 55%       Assessed by: TTE       Date: 9/24/2024    Prior Cardiac Interventions (LHC, stents, CABG): [facility] 9/24/24 via RRA which showed mLAD 90% heavily calcified lesion, LVEDP 14mmHg.       PCI's (Date, Stents, Vessels):        CABG (Date, Grafts):     Stress Test (Date, Findings): 7/11/24 NST with small-sized, mild-mod defects in apical inferolateral and apical septal walls that are reversible suggestive of mild ischemia vs. attenuation from unable to raise arms - poor study    CCTA 8/2024 with Calcium score of 2529 with >70% mLAD lesions + FFR suggestive of hemodynamically significant ischemia.    Echo (Date, Findings): 9/24/24 TTE EF 55%, normal diastolic function, normal RV systolic fxn, dilated aortic root at sinuses of Valsalva, dilated asc aorta, mild-mod eccentric AI, no evidence of pHTN, no pericardial effusion    Antianginal Therapies:        Beta Blockers:  Bisoprolol 10mg po qd       Calcium Channel Blockers:        Long Acting Nitrates:        Ranexa:     Associated Risk Factors:        Frailty Score: (N/A, mild, moderate, severe)       Cerebrovascular Disease: N/A       Chronic Lung Disease: N/A       Peripheral Arterial Disease: N/A       Chronic Kidney Disease (if yes, what is GFR): N/A       Uncontrolled Diabetes (if yes, what is HgbA1C or FBS): N/A       Poorly Controlled Hypertension (if yes, what is SBP): N/A       Morbid Obesity (if yes, what is BMI): N/A       History of Recent Ventricular Arrhythmia: N/A       Inability to Ambulate Safely: N/A       Need for Therapeutic Anticoagulation: N/A       Antiplatelet or Contrast Allergy: N/A    PHYSICAL EXAM:  Constitutional: A & O x 3, NAD  HEENT:  Normal oral mucosa, PERRL, EOMI	  Cardiovascular: S1 S2, III/VI *** murmur, No JVD  Respiratory: Lungs clear to auscultation	  Gastrointestinal:  Soft, Non-tender, + BS	  Skin: No rashes or cyanosis  Neurologic: No deficit appreciated  Extremities: Normal range of motion, *** edema  Vascular: distal pulses +     Risk Stratification:  ASA: 3  Mallampati:   Bleeding Risk:   Creatinine:   GFR:   Pt assessed, appropriate for sedation, pt educated regarding the plan for Versed/Fentanyl as needed.    Plan/Recommendations:   -plan for LHC  -preferred access: RRA vs. RFA  -patient seen and examined  -confirmed appropriate NPO duration  -ECG and Labs reviewed  -Aspirin 81mg po pre-cath ***  -NS 250mL IV bolus pre-cath ***  -procedure discussed with patient; risks and benefits explained, questions answered  -consent obtained by attending IC    Risks, benefits, and alternatives reviewed.  Risks including but not limited to MI, death, stroke, bleeding, infection, vessel injury, hematoma, renal failure, allergic reaction, urgent open heart surgery, restenosis and stent thrombosis were reviewed.  All questions answered.  Patient is agreeable to proceed.    ***LABS DONE ON 9/18/24-- CAN FIND ON HIE*** Narrative: 78M former smoker PMH prostate CA s/p XRT, femur fractural, hand infection sp amputation, large renal cyst, valvular heart disease, dilated aorta, arthritis, hypothyroidism, HTN, HLD now presents after [facility] with severe coronary artery calcification of the mid LAD >70% stenosis. 7/11/24 NST with apical defects, possible ischemia. CCTA 8/2024 with Calcium score of 2529 with >70% mLAD lesions + FFR suggestive of hemodynamically significant ischemia. [facility] 9/24/24 via RRA which showed mLAD 90% heavily calcified lesion, LVEDP 14mmHg. Presents for rotational atherectomy and PCI of mid LAD with Dr. Tejeda on Thursday, 9/26/24.    Review of Systems: negative unless mentioned in HPI    Symptoms: N/A       Angina (Class):        Ischemic Symptoms:     Heart Failure: N/A       Systolic/Diastolic/Combined:        NYHA Class (within 2 weeks):     Assessment of LVEF (Must be within 6 months):       EF: 55%       Assessed by: TTE       Date: 9/24/2024    Prior Cardiac Interventions (LHC, stents, CABG): [facility] 9/24/24 via RRA which showed mLAD 90% heavily calcified lesion, LVEDP 14mmHg.       PCI's (Date, Stents, Vessels):        CABG (Date, Grafts):     Stress Test (Date, Findings): 7/11/24 NST with small-sized, mild-mod defects in apical inferolateral and apical septal walls that are reversible suggestive of mild ischemia vs. attenuation from unable to raise arms - poor study    CCTA 8/2024 with Calcium score of 2529 with >70% mLAD lesions + FFR suggestive of hemodynamically significant ischemia.    EKG: SB 57bpm, 1st degree AVB, LAD, TWIs in leads III and aVF.    Echo (Date, Findings): 9/24/24 TTE EF 55%, normal diastolic function, normal RV systolic fxn, dilated aortic root at sinuses of Valsalva, dilated asc aorta, mild-mod eccentric AI, no evidence of pHTN, no pericardial effusion    Antianginal Therapies:        Beta Blockers:  Bisoprolol 10mg po qd       Calcium Channel Blockers:        Long Acting Nitrates:        Ranexa:     Associated Risk Factors:        Frailty Score: mild       Cerebrovascular Disease: N/A       Chronic Lung Disease: N/A       Peripheral Arterial Disease: N/A       Chronic Kidney Disease (if yes, what is GFR): N/A       Uncontrolled Diabetes (if yes, what is HgbA1C or FBS): N/A       Poorly Controlled Hypertension (if yes, what is SBP): N/A       Morbid Obesity (if yes, what is BMI): N/A       History of Recent Ventricular Arrhythmia: N/A       Inability to Ambulate Safely: N/A       Need for Therapeutic Anticoagulation: N/A       Antiplatelet or Contrast Allergy: N/A    PHYSICAL EXAM:  Constitutional: A & O x 3, NAD  HEENT:  Normal oral mucosa, PERRL, EOMI	  Cardiovascular: S1 S2, no murmur, No JVD  Respiratory: Lungs clear to auscultation	  Gastrointestinal:  Soft, Non-tender, + BS	  Skin: No rashes or cyanosis  Neurologic: No deficit appreciated  Extremities: Normal range of motion, no edema  Vascular: distal pulses +     Risk Stratification:  ASA: 3  Mallampati: 2  Bleeding Risk: 1.3%  Creatinine: 0.74  GFR: 92  Pt assessed, appropriate for sedation, pt educated regarding the plan for Versed/Fentanyl as needed.    Plan/Recommendations:   -plan for LHC  -preferred access: RRA vs. RFA  -patient seen and examined  -confirmed appropriate NPO duration  -ECG and Labs reviewed  -Aspirin 324mg po taken pre-cath ***  -NS 250mL IV bolus pre-cath ***  -procedure discussed with patient; risks and benefits explained, questions answered  -consent obtained by attending IC    Risks, benefits, and alternatives reviewed.  Risks including but not limited to MI, death, stroke, bleeding, infection, vessel injury, hematoma, renal failure, allergic reaction, urgent open heart surgery, restenosis and stent thrombosis were reviewed.  All questions answered.  Patient is agreeable to proceed. Narrative: 78M former smoker PMH prostate CA s/p XRT, femur fractural, hand infection sp amputation, large renal cyst, valvular heart disease, dilated aorta, arthritis, hypothyroidism, HTN, HLD now presents after [facility] with severe coronary artery calcification of the mid LAD >70% stenosis. 7/11/24 NST with apical defects, possible ischemia. CCTA 8/2024 with Calcium score of 2529 with >70% mLAD lesions + FFR suggestive of hemodynamically significant ischemia. [facility] 9/24/24 via RRA which showed mLAD 90% heavily calcified lesion, LVEDP 14mmHg. Presents for rotational atherectomy and PCI of mid LAD with Dr. Tejeda on Thursday, 9/26/24.    Review of Systems: negative unless mentioned in HPI    Symptoms: N/A       Angina (Class):        Ischemic Symptoms:     Heart Failure: N/A       Systolic/Diastolic/Combined:        NYHA Class (within 2 weeks):     Assessment of LVEF (Must be within 6 months):       EF: 55%       Assessed by: TTE       Date: 9/24/2024    Prior Cardiac Interventions (LHC, stents, CABG): [facility] 9/24/24 via RRA which showed mLAD 90% heavily calcified lesion, LVEDP 14mmHg.       PCI's (Date, Stents, Vessels):        CABG (Date, Grafts):     Stress Test (Date, Findings): 7/11/24 NST with small-sized, mild-mod defects in apical inferolateral and apical septal walls that are reversible suggestive of mild ischemia vs. attenuation from unable to raise arms - poor study    CCTA 8/2024 with Calcium score of 2529 with >70% mLAD lesions + FFR suggestive of hemodynamically significant ischemia.    EKG: SB 57bpm, 1st degree AVB, LAD, TWIs in leads III and aVF.    Echo (Date, Findings): 9/24/24 TTE EF 55%, normal diastolic function, normal RV systolic fxn, dilated aortic root at sinuses of Valsalva, dilated asc aorta, mild-mod eccentric AI, no evidence of pHTN, no pericardial effusion    Antianginal Therapies:        Beta Blockers:  Bisoprolol 10mg po qd       Calcium Channel Blockers:        Long Acting Nitrates:        Ranexa:     Associated Risk Factors:        Frailty Score: mild       Cerebrovascular Disease: N/A       Chronic Lung Disease: N/A       Peripheral Arterial Disease: N/A       Chronic Kidney Disease (if yes, what is GFR): N/A       Uncontrolled Diabetes (if yes, what is HgbA1C or FBS): N/A       Poorly Controlled Hypertension (if yes, what is SBP): N/A       Morbid Obesity (if yes, what is BMI): N/A       History of Recent Ventricular Arrhythmia: N/A       Inability to Ambulate Safely: N/A       Need for Therapeutic Anticoagulation: N/A       Antiplatelet or Contrast Allergy: N/A    PHYSICAL EXAM:  Constitutional: A & O x 3, NAD  HEENT:  Normal oral mucosa, PERRL, EOMI	  Cardiovascular: S1 S2, no murmur, No JVD  Respiratory: Lungs clear to auscultation	  Gastrointestinal:  Soft, Non-tender, + BS	  Skin: No rashes or cyanosis  Neurologic: No deficit appreciated  Extremities: Normal range of motion, no edema  Vascular: distal pulses +     Risk Stratification:  ASA: 3  Mallampati: 2  Bleeding Risk: 1.3%  Creatinine: 0.74  GFR: 92  Pt assessed, appropriate for sedation, pt educated regarding the plan for Versed/Fentanyl as needed.    Plan/Recommendations:   -plan for C  -preferred access: RRA vs. RFA  -patient seen and examined  -confirmed appropriate NPO duration  -ECG and Labs reviewed  -Aspirin 324mg and Plavix 75mg po taken pre-cath ***  -NS 250mL IV bolus pre-cath ***  -procedure discussed with patient; risks and benefits explained, questions answered  -consent obtained by attending IC    Risks, benefits, and alternatives reviewed.  Risks including but not limited to MI, death, stroke, bleeding, infection, vessel injury, hematoma, renal failure, allergic reaction, urgent open heart surgery, restenosis and stent thrombosis were reviewed.  All questions answered.  Patient is agreeable to proceed.

## 2024-09-26 ENCOUNTER — TRANSCRIPTION ENCOUNTER (OUTPATIENT)
Age: 79
End: 2024-09-26

## 2024-09-26 ENCOUNTER — INPATIENT (INPATIENT)
Facility: HOSPITAL | Age: 79
LOS: 0 days | Discharge: HOME | DRG: 322 | End: 2024-09-27
Attending: INTERNAL MEDICINE | Admitting: INTERNAL MEDICINE
Payer: COMMERCIAL

## 2024-09-26 VITALS
TEMPERATURE: 97 F | HEART RATE: 61 BPM | RESPIRATION RATE: 16 BRPM | OXYGEN SATURATION: 100 % | DIASTOLIC BLOOD PRESSURE: 90 MMHG | SYSTOLIC BLOOD PRESSURE: 152 MMHG

## 2024-09-26 DIAGNOSIS — I25.10 ATHEROSCLEROTIC HEART DISEASE OF NATIVE CORONARY ARTERY WITHOUT ANGINA PECTORIS: ICD-10-CM

## 2024-09-26 DIAGNOSIS — Z96.649 PRESENCE OF UNSPECIFIED ARTIFICIAL HIP JOINT: Chronic | ICD-10-CM

## 2024-09-26 DIAGNOSIS — Z98.890 OTHER SPECIFIED POSTPROCEDURAL STATES: Chronic | ICD-10-CM

## 2024-09-26 DIAGNOSIS — Z96.651 PRESENCE OF RIGHT ARTIFICIAL KNEE JOINT: Chronic | ICD-10-CM

## 2024-09-26 PROCEDURE — 93010 ELECTROCARDIOGRAM REPORT: CPT

## 2024-09-26 PROCEDURE — 99152 MOD SED SAME PHYS/QHP 5/>YRS: CPT

## 2024-09-26 PROCEDURE — 92933 PRQ TRLML C ATHRC ST ANGIOP1: CPT | Mod: LD

## 2024-09-26 RX ORDER — TAMSULOSIN HCL 0.4 MG
1 CAPSULE ORAL
Refills: 0 | DISCHARGE

## 2024-09-26 RX ORDER — TAMSULOSIN HCL 0.4 MG
0.4 CAPSULE ORAL AT BEDTIME
Refills: 0 | Status: DISCONTINUED | OUTPATIENT
Start: 2024-09-26 | End: 2024-09-27

## 2024-09-26 RX ORDER — METOPROLOL TARTRATE 50 MG
100 TABLET ORAL
Refills: 0 | Status: DISCONTINUED | OUTPATIENT
Start: 2024-09-26 | End: 2024-09-27

## 2024-09-26 RX ORDER — CHLORHEXIDINE GLUCONATE ORAL RINSE 1.2 MG/ML
1 SOLUTION DENTAL ONCE
Refills: 0 | Status: DISCONTINUED | OUTPATIENT
Start: 2024-09-26 | End: 2024-09-27

## 2024-09-26 RX ORDER — SODIUM CHLORIDE 0.9 % (FLUSH) 0.9 %
250 SYRINGE (ML) INJECTION ONCE
Refills: 0 | Status: COMPLETED | OUTPATIENT
Start: 2024-09-26 | End: 2024-09-26

## 2024-09-26 RX ORDER — ASPIRIN 325 MG
81 TABLET ORAL DAILY
Refills: 0 | Status: DISCONTINUED | OUTPATIENT
Start: 2024-09-27 | End: 2024-09-27

## 2024-09-26 RX ORDER — ATORVASTATIN CALCIUM 10 MG/1
40 TABLET, FILM COATED ORAL AT BEDTIME
Refills: 0 | Status: DISCONTINUED | OUTPATIENT
Start: 2024-09-26 | End: 2024-09-27

## 2024-09-26 RX ADMIN — Medication 100 MILLIGRAM(S): at 17:13

## 2024-09-26 RX ADMIN — Medication 250 MILLILITER(S): at 09:50

## 2024-09-26 RX ADMIN — Medication 250 MILLILITER(S): at 16:56

## 2024-09-26 RX ADMIN — Medication 0.4 MILLIGRAM(S): at 21:05

## 2024-09-26 NOTE — DISCHARGE NOTE PROVIDER - NSDCCPTREATMENT_GEN_ALL_CORE_FT
PRINCIPAL PROCEDURE  Procedure: Left heart cardiac cath  Findings and Treatment:      PRINCIPAL PROCEDURE  Procedure: Left heart cardiac cath  Findings and Treatment: severe mid LAD stenosis --> Rotational atherectomy, BRITTANY PCI x 1 ([place] 3.0x30mm)  Plan:  -Formal cath report pending  -Post procedure management/monitoring per protocol  -Access site precautions  -Bedrest x 1 hour post procedure  -Labs and EKG in am  -NS 0.9% 250ml/hr x 1 bolus: post procedure CARINA ppx   -Repeat ECG if any clinical indication or change on tele  -Continue current medical therapy  -Dual anti platelet therapy with aspirin/plavix **  -Cont BB with Bisoprolol 10mg po daily **  -Cont statin therapy with Lipitor 40mg po qHS ** -- will consider increasing to 80mg pending lipid profile as outpatient with Dr. Vega. Will defer starting Zetia for hypertriglyceridemia for now, will follow up with Dr. Vega regarding further management.  -Educated regarding strict adherence with DAPT   -Educated regarding post procedure management and care  -Discussed the importance of RF modification  -Cardiac rehab info provided/referral and communication to cardiac rehab completed  -F/u 10/1/24 at 9:20am with Dr. Tejeda in [place] office  -F/U outpt in 1-2 weeks with Cardiologist Dr. Vega  -DISPO: Plan for D/C in am if remains HDS, ECG and labs in am stable and without complications.     PRINCIPAL PROCEDURE  Procedure: Left heart cardiac cath  Findings and Treatment: severe mid LAD stenosis --> Rotational atherectomy, BRITTANY PCI x 1 ([place] 3.0x30mm)  Plan:  -Post procedure management/monitoring per protocol  -Access site precautions  Continue current medical therapy  -Dual anti platelet therapy with aspirin/plavix  -Cont BB with Bisoprolol 10mg po daily **  -Cont statin therapy with Lipitor 40mg po qHS will consider increasing to 80mg pending lipid profile as outpatient with Dr. Vega. Will defer starting Zetia for hypertriglyceridemia for now, will follow up with Dr. Vega regarding further management.  -Educated regarding strict adherence with DAPT   -Educated regarding post procedure management and care  -Discussed the importance of RF modification  -Cardiac rehab info provided/referral and communication to cardiac rehab completed  -F/u 10/1/24 at 9:20am with Dr. Tejeda in [place] office  -F/U outpt in 1-2 weeks with Cardiologist Dr. Vega  -DISPO: Plan for D/C in am if remains HDS, ECG and labs in am stable and without complications.

## 2024-09-26 NOTE — DISCHARGE NOTE PROVIDER - NSDCMRMEDTOKEN_GEN_ALL_CORE_FT
Aspirin Enteric Coated 325 mg oral delayed release tablet: 1 tab(s) orally 2 times a day MDD:2  atorvastatin 10 mg oral tablet: 1 tab(s) orally once a day  bisoprolol-hydrochlorothiazide 10 mg-6.25 mg oral tablet: 1 tab(s) orally once a day  levothyroxine 100 mcg (0.1 mg) oral tablet: 1 tab(s) orally once a day  Multiple Vitamins oral capsule: 1 cap(s) orally once a day  oxyCODONE 5 mg oral tablet: 1-2 tab(s) orally every 4 to 6 hours, As Needed MDD:6   Senna S 50 mg-8.6 mg oral tablet: 2 tab(s) orally once a day (at bedtime) MDD:2  Vitamin B12 1000 mcg oral tablet: 1 tab(s) orally once a day  Vitamin D3 1000 intl units oral capsule: 1 cap(s) orally once a day   aspirin 81 mg oral tablet: 1 tab(s) orally once a day  atorvastatin 40 mg oral tablet: 1 tab(s) orally once a day  bisoprolol-hydrochlorothiazide 10 mg-6.25 mg oral tablet: 1 tab(s) orally once a day  levothyroxine 100 mcg (0.1 mg) oral tablet: 1 tab(s) orally once a day  Multiple Vitamins oral capsule: 1 cap(s) orally once a day  tamsulosin 0.4 mg oral capsule: 1 cap(s) orally once a day (at bedtime)  Vitamin B12 1000 mcg oral tablet: 1 tab(s) orally once a day  Vitamin D3 1000 intl units oral capsule: 1 cap(s) orally once a day   Aspirin EC 81 mg oral delayed release tablet: 1 tab(s) orally once a day  atorvastatin 40 mg oral tablet: 1 tab(s) orally once a day (at bedtime)  bisoprolol-hydrochlorothiazide 10 mg-6.25 mg oral tablet: 1 tab(s) orally once a day  clopidogrel 75 mg oral tablet: 1 tab(s) orally once a day  levothyroxine 100 mcg (0.1 mg) oral tablet: 1 tab(s) orally once a day  Multiple Vitamins oral capsule: 1 cap(s) orally once a day  tamsulosin 0.4 mg oral capsule: 1 cap(s) orally once a day (at bedtime)  Vitamin B12 1000 mcg oral tablet: 1 tab(s) orally once a day  Vitamin D3 1000 intl units oral capsule: 1 cap(s) orally once a day

## 2024-09-26 NOTE — DISCHARGE NOTE PROVIDER - PROVIDER TOKENS
PROVIDER:[TOKEN:[[id number]],FOLLOWUP:[2 weeks],ESTABLISHEDPATIENT:[T]] PROVIDER:[TOKEN:[[id number]],SCHEDULEDAPPT:[10/01/2024],SCHEDULEDAPPTTIME:[09:20 AM],ESTABLISHEDPATIENT:[T]] PROVIDER:[TOKEN:[[id number]],SCHEDULEDAPPT:[10/01/2024],SCHEDULEDAPPTTIME:[09:20 AM],ESTABLISHEDPATIENT:[T]],PROVIDER:[TOKEN:[[id number]],FOLLOWUP:[2 weeks]] PROVIDER:[TOKEN:[[id number]],FOLLOWUP:[1 week]]

## 2024-09-26 NOTE — ASU PATIENT PROFILE, ADULT - FALL HARM RISK - UNIVERSAL INTERVENTIONS
Bed in lowest position, wheels locked, appropriate side rails in place/Call bell, personal items and telephone in reach/Instruct patient to call for assistance before getting out of bed or chair/Non-slip footwear when patient is out of bed/[place] to call system/Physically safe environment - no spills, clutter or unnecessary equipment/Purposeful Proactive Rounding/Room/bathroom lighting operational, light cord in reach

## 2024-09-26 NOTE — DISCHARGE NOTE PROVIDER - NSDCCPCAREPLAN_GEN_ALL_CORE_FT
PRINCIPAL DISCHARGE DIAGNOSIS  Diagnosis: CAD (coronary artery disease)  Assessment and Plan of Treatment: Coronary artery disease is the buildup of plaque in the arteries that supply oxygen-rich blood to your heart. Plaque causes a narrowing or blockage that could result in a heart attack. Symptoms include chest pain or discomfort, shortness of breath, dizziness, palpitations, fatigue or reduced exercise tolerance. .  Go to the ED with any acute onset of chest pain, palpitations, shortness of breath or dizziness. Do NOT miss a dose or stop taking your Aspirin and ***, these medications keep your stent open and prevent a heart attack. If anyone tells you to stop these medications, speak to your cardiologist immediately.  Managing risk factors will help keep your stent open and prevent future blockages, risk factors may include: high blood pressure, high cholesterol, diabetes, obesity, sedentary life style and smoking.    Your diet should be low in fat, cholesterol, salt and carbohydrates, increase fruits (caution if diabetic), vegetables and whole grains/fiber rich foods.   Take all your cardiac  medications as prescribed.    Exercise is a very important factor in heart health. Once your post procedure restrictions have passed, you should engage in heart healthy, aerobic exercise. Be sure to have clearance from your cardiologist. Cardiac rehab programs could be extremely beneficial and your cardiologist could help set this up.   Follow up with your cardiologist within 1-2 weeks after your procedure.   Call your cardiologist or our unit ([phone]) with any questions or concerns that may arise.

## 2024-09-26 NOTE — DISCHARGE NOTE PROVIDER - NSDCFUSCHEDAPPT_GEN_ALL_CORE_FT
Quique Tejeda  [facility]  CARDIOLOGY [address]  Scheduled Appointment: 10/01/2024    Gage Beltrán  [facility]  UROLOGY [address]  Scheduled Appointment: 10/21/2024    Kaz Silva  [facility]  RADMED [address]  Scheduled Appointment: 10/23/2024

## 2024-09-26 NOTE — DISCHARGE NOTE PROVIDER - HOSPITAL COURSE
78M former smoker PMH prostate CA s/p XRT, femur fractural, hand infection sp amputation, large renal cyst, valvular heart disease, dilated aorta, arthritis, hypothyroidism, HTN, HLD now presents after LHC with severe coronary artery calcification of the mid LAD >70% stenosis. 7/11/24 NST with apical defects, possible ischemia. CCTA 8/2024 with Calcium score of 2529 with >70% mLAD lesions + FFR suggestive of hemodynamically significant ischemia. [facility] 9/24/24 via RRA which showed mLAD 90% heavily calcified lesion, LVEDP 14mmHg. Presents for rotational atherectomy and PCI of mid LAD with Dr. Tejeda on Thursday, 9/26/24. 78M former smoker PMH prostate CA s/p XRT, femur fractural, hand infection sp amputation, large renal cyst, valvular heart disease, dilated aorta, arthritis, hypothyroidism, HTN, HLD now presents after LHC with severe coronary artery calcification of the mid LAD >70% stenosis. 7/11/24 NST with apical defects, possible ischemia. CCTA 8/2024 with Calcium score of 2529 with >70% mLAD lesions + FFR suggestive of hemodynamically significant ischemia. LHC 9/24/24 via RRA which showed mLAD 90% heavily calcified lesion, LVEDP 14mmHg. Presents for rotational atherectomy and PCI of mid LAD with Dr. Tejeda on Thursday, 9/26/24.    Now s/p LHC via RFA with Dr. Tejeda, tolerated procedure well. Pt arrived to recovery in NAD and HDS, RFA access site stable, no bleed/hematoma, distal pulse +,   Intraprocedural findings: severe mid LAD stenosis --> Rotational atherectomy, BRITTANY PCI x 1 ([place] 3.0x30mm)  Medications:   P2Y12 inhibitor: Plavix 600mg PO x 1  Fentanyl: 50mcg IVP  Versed: 1mg IVP  Heparin: 12,000U  Omnipaque: 175cc  Closure Device: RFA Angioseal  Post Cath EKG: SB     Reasons for Admission (if underwent PCI):       Patient is already an inpatient: N/A       ACS (STEMI/NSTEMI/UA): N/A       Clinical Heart Failure: N/A        Age > 75 years: Yes       Cerebrovascular Disease: N/A       Chronic Lung Disease: N/A       Peripheral Arterial Disease: N/A       Chronic Kidney Disease (if yes, what is GFR): N/A       Uncontrolled Diabetes (if yes, what is HgbA1C or FBS): N/A       Poorly Controlled Hypertension (if yes, what is SBP): N/A       Morbid Obesity (if yes, what is BMI): N/A       History of Recent Ventricular Arrhythmia: N/A       Inability to Ambulate Safely: N/A       Need for Therapeutic Anticoagulation: N/A       Antiplatelet or Contrast Allergy: N/A    Plan:  -Formal cath report pending  -Post procedure management/monitoring per protocol  -Access site precautions  -Bedrest x 1 hour post procedure  -Labs and EKG in am  -NS 0.9% 250ml/hr x 1 bolus: post procedure CARINA ppx   -Repeat ECG if any clinical indication or change on tele  -Continue current medical therapy  -Dual anti platelet therapy with aspirin/plavix **  -Cont BB with Bisoprolol 10mg po daily **  -Cont statin therapy with Lipitor 40mg po qHS ** -- will consider increasing to 80mg pending lipid profile as outpatient with Dr. Vega. Will defer starting Zetia for hypertriglyceridemia for now, will follow up with Dr. Vega regarding further management.  -Educated regarding strict adherence with DAPT   -Educated regarding post procedure management and care  -Discussed the importance of RF modification  -Cardiac rehab info provided/referral and communication to cardiac rehab completed  -F/u 10/1/24 at 9:20am with Dr. Tejeda in [place] office  -F/U outpt in 1-2 weeks with Cardiologist Dr. Vega  -DISPO: Plan for D/C in am if remains HDS, ECG and labs in am stable and without complications.   78M former smoker PMH prostate CA s/p XRT, femur fractural, hand infection sp amputation, large renal cyst, valvular heart disease, dilated aorta, arthritis, hypothyroidism, HTN, HLD now presents after LHC with severe coronary artery calcification of the mid LAD >70% stenosis. 7/11/24 NST with apical defects, possible ischemia. CCTA 8/2024 with Calcium score of 2529 with >70% mLAD lesions + FFR suggestive of hemodynamically significant ischemia. LHC 9/24/24 via RRA which showed mLAD 90% heavily calcified lesion, LVEDP 14mmHg. Presents for rotational atherectomy and PCI of mid LAD with Dr. Tejeda on Thursday, 9/26/24.    Now s/p LHC via RFA with Dr. Tejeda, tolerated procedure well. Pt arrived to recovery in NAD and HDS, RFA access site stable, no bleed/hematoma, distal pulse +,   Intraprocedural findings: severe mid LAD stenosis --> Rotational atherectomy, BRITTANY PCI x 1 ([place] 3.0x30mm)  Medications:   P2Y12 inhibitor: Plavix 600mg PO x 1  Fentanyl: 50mcg IVP  Versed: 1mg IVP  Heparin: 12,000U  Omnipaque: 175cc  Closure Device: RFA Angioseal  Post Cath EKG: SB 52bpm, 1st degree AVB, LAD, TWIs in leads III and aVF.    Plan:  -Formal cath report pending  -Post procedure management/monitoring per protocol  -Access site precautions  -Bedrest x 1 hour post procedure  -Labs and EKG in am  -NS 0.9% 250ml/hr x 1 bolus: post procedure CARINA ppx   -Repeat ECG if any clinical indication or change on tele  -Continue current medical therapy  -Dual anti platelet therapy with aspirin/plavix **  -Cont BB with Bisoprolol 10mg po daily **  -Cont statin therapy with Lipitor 40mg po qHS ** -- will consider increasing to 80mg pending lipid profile as outpatient with Dr. Vega. Will defer starting Zetia for hypertriglyceridemia for now, will follow up with Dr. Vega regarding further management.  -Educated regarding strict adherence with DAPT   -Educated regarding post procedure management and care  -Discussed the importance of RF modification  -Cardiac rehab info provided/referral and communication to cardiac rehab completed  -F/u 10/1/24 at 9:20am with Dr. Tejeda in [place] office  -F/U outpt in 1-2 weeks with Cardiologist Dr. Vgea  -DISPO: Plan for D/C in am if remains HDS, ECG and labs in am stable and without complications.     78M former smoker PMH prostate CA s/p XRT, femur fracture, hand infection sp amputation, large renal cyst, valvular heart disease, dilated aorta, arthritis, hypothyroidism, HTN, HLD now presents after LHC with severe coronary artery calcification of the mid LAD >70% stenosis. 7/11/24 NST with apical defects, possible ischemia. CCTA 8/2024 with Calcium score of 2529 with >70% mLAD lesions + FFR suggestive of hemodynamically significant ischemia. LHC 9/24/24 via RRA which showed mLAD 90% heavily calcified lesion, LVEDP 14mmHg. Presents for rotational atherectomy and PCI of mid LAD with Dr. Tejeda on Thursday, 9/26/24.    Now s/p LHC via RFA with Dr. Tejeda, tolerated procedure well. Pt arrived to recovery in NAD and HDS, RFA access site stable, no bleed/hematoma, distal pulse +,   Intraprocedural findings: severe mid LAD stenosis --> Rotational atherectomy, BRITTANY PCI x 1 ([place] 3.0x30mm)  Medications:   P2Y12 inhibitor: Plavix 600mg PO x 1  Fentanyl: 50mcg IVP  Versed: 1mg IVP  Heparin: 12,000U  Omnipaque: 175cc  Closure Device: RFA Angioseal  Post Cath EKG: SB 52bpm, 1st degree AVB, LAD, TWIs in leads III and aVF.    < from: Cardiac Catheterization (09.26.24 @ 11:35) >    Procedures Performed   Procedures:              1.    Ultrasound Guided Access     2.    Arterial Access - Right Femoral   3.    PCI: Rotational Atherectomy   4.   PCI: BRITTANY   5.    AngioSeal     Indications:               high risk ccta   abnormal stress test LAD ischemia intermediate risk     Conclusions:     successful pci of mid LAD with 1.5 mm rotational atherectomy and 1 BRITTANY  (3.0x30 post dilated 4.0 mm). This was in setting of  high risk CCTA (calcium score 2529 and CT-FFR 0.63) and abnormal  perfusion imaging.    **Known thoracic aorta dilation 4.6 cm. Case made fairly challenging.    Recommendations:     dapt for at least 6 months preferably 1 year. just increased  atorvastatin to 40. would benefit from cardiac rehab.    Acute complication:    No complications     < end of copied text >  Plan:  -Post procedure management/monitoring per protocol  -Access site precautions  -Labs and EKG in am stable, 12 lead EKG WITH NO ACUTE st/t changes   -NS 0.9% 250ml/hr x 1 bolus: post procedure CARINA ppx   -Continue current medical therapy  -Dual anti platelet therapy with aspirin/plavix   -Cont BB with Bisoprolol 10mg po daily   -Cont statin therapy with Lipitor 40mg po qHS , will consider increasing to 80mg pending lipid profile as outpatient with Dr. Vega. Will defer starting Zetia for hypertriglyceridemia for now, will follow up with Dr. Vega regarding further management.  -Educated regarding strict adherence with DAPT   -Educated regarding post procedure management and care  -Discussed the importance of RF modification  -Cardiac rehab info provided/referral and communication to cardiac rehab completed  -F/u 10/1/24 at 9:20am with Dr. Tejeda in [place] office  -F/U outpt in 1-2 weeks with Cardiologist Dr. Vega  -Patient stable for discharge to home, He verbalized understanding of discharge instructions      78M former smoker PMH prostate CA s/p XRT, femur fracture, hand infection sp amputation, large renal cyst, valvular heart disease, dilated aorta, arthritis, hypothyroidism, HTN, HLD now presents after LHC with severe coronary artery calcification of the mid LAD >70% stenosis. 7/11/24 NST with apical defects, possible ischemia. CCTA 8/2024 with Calcium score of 2529 with >70% mLAD lesions + FFR suggestive of hemodynamically significant ischemia. LHC 9/24/24 via RRA which showed mLAD 90% heavily calcified lesion, LVEDP 14mmHg. Presents for rotational atherectomy and PCI of mid LAD with Dr. Tejeda on Thursday, 9/26/24.  Patient s/p LHC via RFA with Dr. Tejeda, on 09/27/24,  tolerated procedure well. RFA access site stable, no bleed/hematoma, distal pulse +,   Intraprocedural findings: severe mid LAD stenosis --> Rotational atherectomy, BRITTANY PCI x 1 ([place] 3.0x30mm)  Medications:   P2Y12 inhibitor: Plavix 600mg PO x 1  Fentanyl: 50mcg IVP  Versed: 1mg IVP  Heparin: 12,000U  Omnipaque: 175cc  Closure Device: RFA Angioseal  Post Cath EKG: SB 52bpm, 1st degree AVB, LAD, TWIs in leads III and aVF.    < from: Cardiac Catheterization (09.26.24 @ 11:35) >    Procedures Performed   Procedures:              1.    Ultrasound Guided Access     2.    Arterial Access - Right Femoral   3.    PCI: Rotational Atherectomy   4.   PCI: BRITTANY   5.    AngioSeal     Indications:               high risk ccta   abnormal stress test LAD ischemia intermediate risk     Conclusions:     successful pci of mid LAD with 1.5 mm rotational atherectomy and 1 BRITTANY  (3.0x30 post dilated 4.0 mm). This was in setting of  high risk CCTA (calcium score 2529 and CT-FFR 0.63) and abnormal  perfusion imaging.    **Known thoracic aorta dilation 4.6 cm. Case made fairly challenging.    Recommendations:     dapt for at least 6 months preferably 1 year. just increased  atorvastatin to 40. would benefit from cardiac rehab.    Acute complication:    No complications     < end of copied text >  Plan:  -Post procedure management/monitoring per protocol  -Access site precautions  -Labs and EKG in am stable, 12 lead EKG WITH SR, LVH,  NO ACUTE st/t changes   -NS 0.9% 250ml/hr x 1 bolus: post procedure CARINA ppx   -Continue current medical therapy  -Dual anti platelet therapy with aspirin/plavix   -Cont BB with Bisoprolol 10mg po daily   -Cont statin therapy with Lipitor 40mg po qHS , will consider increasing to 80mg pending lipid profile as outpatient with Dr. Vega. Will defer starting Zetia for hypertriglyceridemia for now, will follow up with Dr. Vega regarding further management.  -Educated regarding strict adherence with DAPT   -Educated regarding post procedure management and care  -Discussed the importance of RF modification  -Cardiac rehab info provided/referral and communication to cardiac rehab completed  -F/u 10/1/24 at 9:20am with Dr. Tejeda in [place] office  -F/U outpt in 1-2 weeks with Cardiologist Dr. Vega  -Patient stable for discharge to home, He verbalized understanding of discharge instructions      78M former smoker PMH prostate CA s/p XRT, femur fracture, hand infection sp amputation, large renal cyst, valvular heart disease, dilated aorta, arthritis, hypothyroidism, HTN, HLD now presents after LHC with severe coronary artery calcification of the mid LAD >70% stenosis. 7/11/24 NST with apical defects, possible ischemia. CCTA 8/2024 with Calcium score of 2529 with >70% mLAD lesions + FFR suggestive of hemodynamically significant ischemia. LHC 9/24/24 via RRA which showed mLAD 90% heavily calcified lesion, LVEDP 14mmHg. Presents for rotational atherectomy and PCI of mid LAD with Dr. Tejeda on Thursday, 9/26/24.  Patient s/p LHC via RFA with Dr. Tejeda, on 09/27/24,  tolerated procedure well. RFA access site stable, no bleed/hematoma, distal pulse +,   Intraprocedural findings: severe mid LAD stenosis --> Rotational atherectomy, BRITTANY PCI x 1 ([place] 3.0x30mm)  Medications:   P2Y12 inhibitor: Plavix 600mg PO x 1  Fentanyl: 50mcg IVP  Versed: 1mg IVP  Heparin: 12,000U  Omnipaque: 175cc  Closure Device: RFA Angioseal  Post Cath EKG: SB 52bpm, 1st degree AVB, LAD, TWIs in leads III and aVF.    < from: Cardiac Catheterization (09.26.24 @ 11:35) >    Procedures Performed   Procedures:              1.    Ultrasound Guided Access     2.    Arterial Access - Right Femoral   3.    PCI: Rotational Atherectomy   4.   PCI: BRITTANY   5.    AngioSeal     Indications:               high risk ccta   abnormal stress test LAD ischemia intermediate risk     Conclusions:     successful pci of mid LAD with 1.5 mm rotational atherectomy and 1 BRITTANY  (3.0x30 post dilated 4.0 mm). This was in setting of  high risk CCTA (calcium score 2529 and CT-FFR 0.63) and abnormal  perfusion imaging.    **Known thoracic aorta dilation 4.6 cm. Case made fairly challenging.    Recommendations:     dapt for at least 6 months preferably 1 year. just increased  atorvastatin to 40. would benefit from cardiac rehab.    Acute complication:    No complications     < end of copied text >  Plan:  -Post procedure management/monitoring per protocol  -Access site precautions  -Labs and EKG in am stable, 12 lead EKG WITH SR, LVH,  NO ACUTE st/t changes   -NS 0.9% 250ml/hr x 1 bolus: post procedure CARINA ppx   -Continue current medical therapy  -Dual anti platelet therapy with aspirin/plavix   -Cont BB with Bisoprolol 10mg po daily   -Cont statin therapy with Lipitor 40mg po qHS , will consider increasing to 80mg pending lipid profile as outpatient with Dr. Tejeda  Will defer starting Zetia for hypertriglyceridemia for now, will follow up with OP cardiology DR Tejeda  -Educated regarding strict adherence with DAPT   -Educated regarding post procedure management and care  -Discussed the importance of RF modification  -Cardiac rehab info provided/referral and communication to cardiac rehab completed  -F/u 10/1/24 at 9:20am with Dr. Tejeda in [place] office  -F/U outpt in 1-2 weeks with Cardiologist Dr. Vega  -Patient stable for discharge to home, He verbalized understanding of discharge instructions

## 2024-09-26 NOTE — DISCHARGE NOTE PROVIDER - CARE PROVIDER_API CALL
Quique Tejeda)  Cardiovascular Disease  [address] [place] [address]  Phone: [phone]  Fax: [phone]  Established Patient  Follow Up Time: 2 weeks   Quique Tejeda)  Cardiovascular Disease  [address] [place] [address]  Phone: [phone]  Fax: [phone]  Established Patient  Scheduled Appointment: 10/01/2024 09:20 AM   Quique Tejeda)  Cardiovascular Disease  [address] [place] [address]  Phone: [phone]  Fax: [phone]  Established Patient  Scheduled Appointment: 10/01/2024 09:20 AM    RAMIREZ WRIGHT  [address] [place] [address]  Phone: [phone]  Fax: [phone]  Follow Up Time: 2 weeks   Quique Tejeda)  Cardiovascular Disease  [address] [place] [address]  Phone: [phone]  Fax: [phone]  Follow Up Time: 1 week

## 2024-09-26 NOTE — CHART NOTE - NSCHARTNOTEFT_GEN_A_CORE
Now s/p LHC via RFA with Dr. Tejeda, tolerated procedure well. Pt arrived to recovery in NAD and HDS, RFA access site stable, no bleed/hematoma, distal pulse +,   Intraprocedural findings: severe mid LAD stenosis --> Rotational atherectomy, BRITTANY PCI x 1 (Thom [place] 3.0x30mm)  Medications:   P2Y12 inhibitor: Plavix 600mg PO x 1  Fentanyl: 50mcg IVP  Versed: 1mg IVP  Heparin: 12,000U  Omnipaque: 175cc  Closure Device: RFA Angioseal  Post Cath EKG: SB     Reasons for Admission (if underwent PCI):       Patient is already an inpatient: N/A       ACS (STEMI/NSTEMI/UA): N/A       Clinical Heart Failure: N/A        Age > 75 years: Yes       Cerebrovascular Disease: N/A       Chronic Lung Disease: N/A       Peripheral Arterial Disease: N/A       Chronic Kidney Disease (if yes, what is GFR): N/A       Uncontrolled Diabetes (if yes, what is HgbA1C or FBS): N/A       Poorly Controlled Hypertension (if yes, what is SBP): N/A       Morbid Obesity (if yes, what is BMI): N/A       History of Recent Ventricular Arrhythmia: N/A       Inability to Ambulate Safely: N/A       Need for Therapeutic Anticoagulation: N/A       Antiplatelet or Contrast Allergy: N/A    Plan:  -Formal cath report pending  -Post procedure management/monitoring per protocol  -Access site precautions  -Bedrest x 1 hour post procedure  -Labs and EKG in am  -NS 0.9% 250ml/hr x 1 bolus: post procedure CARINA ppx   -Repeat ECG if any clinical indication or change on tele  -Continue current medical therapy  -Dual anti platelet therapy with aspirin/plavix **  -Cont BB with Bisoprolol 10mg po daily **  -Cont statin therapy with Lipitor 40mg po qHS ** -- will consider increasing to 80mg pending lipid profile as outpatient with Dr. Vega. Will defer starting Zetia for hypertriglyceridemia for now, will follow up with Dr. Vega regarding further management.  -Educated regarding strict adherence with DAPT   -Educated regarding post procedure management and care  -Discussed the importance of RF modification  -Cardiac rehab info provided/referral and communication to cardiac rehab completed  -F/u 10/1/24 at 9:20am with Dr. Tejeda in [place] office  -F/U outpt in 1-2 weeks with Cardiologist Dr. Silvia BORGES: Plan for D/C in am if remains HDS, ECG and labs in am stable and without complications 4 = No assist / stand by assistance

## 2024-09-26 NOTE — DISCHARGE NOTE PROVIDER - NSDCACTIVITY_GEN_ALL_CORE
Do not drive or operate machinery/Do not make important decisions Do not drive or operate machinery/Showering allowed/Do not make important decisions/Stairs allowed/Walking - Indoors allowed/No heavy lifting/straining/Walking - Outdoors allowed

## 2024-09-26 NOTE — DISCHARGE NOTE PROVIDER - CARE PROVIDERS DIRECT ADDRESSES
,[email or web address]

## 2024-09-27 ENCOUNTER — TRANSCRIPTION ENCOUNTER (OUTPATIENT)
Age: 79
End: 2024-09-27

## 2024-09-27 VITALS
HEART RATE: 60 BPM | RESPIRATION RATE: 18 BRPM | SYSTOLIC BLOOD PRESSURE: 112 MMHG | DIASTOLIC BLOOD PRESSURE: 71 MMHG | TEMPERATURE: 98 F | OXYGEN SATURATION: 97 %

## 2024-09-27 LAB
ALBUMIN SERPL ELPH-MCNC: 3.8 G/DL — SIGNIFICANT CHANGE UP (ref 3.3–5.2)
ALP SERPL-CCNC: 69 U/L — SIGNIFICANT CHANGE UP (ref 40–120)
ALT FLD-CCNC: 19 U/L — SIGNIFICANT CHANGE UP
ANION GAP SERPL CALC-SCNC: 14 MMOL/L — SIGNIFICANT CHANGE UP (ref 5–17)
AST SERPL-CCNC: 32 U/L — SIGNIFICANT CHANGE UP
BASOPHILS # BLD AUTO: 0.02 K/UL — SIGNIFICANT CHANGE UP (ref 0–0.2)
BASOPHILS NFR BLD AUTO: 0.3 % — SIGNIFICANT CHANGE UP (ref 0–2)
BILIRUB SERPL-MCNC: 0.7 MG/DL — SIGNIFICANT CHANGE UP (ref 0.4–2)
BUN SERPL-MCNC: 20.4 MG/DL — HIGH (ref 8–20)
CALCIUM SERPL-MCNC: 9.2 MG/DL — SIGNIFICANT CHANGE UP (ref 8.4–10.5)
CHLORIDE SERPL-SCNC: 102 MMOL/L — SIGNIFICANT CHANGE UP (ref 96–108)
CO2 SERPL-SCNC: 22 MMOL/L — SIGNIFICANT CHANGE UP (ref 22–29)
CREAT SERPL-MCNC: 0.74 MG/DL — SIGNIFICANT CHANGE UP (ref 0.5–1.3)
EGFR: 92 ML/MIN/1.73M2 — SIGNIFICANT CHANGE UP
EOSINOPHIL # BLD AUTO: 0.13 K/UL — SIGNIFICANT CHANGE UP (ref 0–0.5)
EOSINOPHIL NFR BLD AUTO: 2.2 % — SIGNIFICANT CHANGE UP (ref 0–6)
GLUCOSE SERPL-MCNC: 109 MG/DL — HIGH (ref 70–99)
HCT VFR BLD CALC: 35.6 % — LOW (ref 39–50)
HGB BLD-MCNC: 11.5 G/DL — LOW (ref 13–17)
IMM GRANULOCYTES NFR BLD AUTO: 0.3 % — SIGNIFICANT CHANGE UP (ref 0–0.9)
LYMPHOCYTES # BLD AUTO: 0.67 K/UL — LOW (ref 1–3.3)
LYMPHOCYTES # BLD AUTO: 11.6 % — LOW (ref 13–44)
MAGNESIUM SERPL-MCNC: 1.9 MG/DL — SIGNIFICANT CHANGE UP (ref 1.6–2.6)
MCHC RBC-ENTMCNC: 29.9 PG — SIGNIFICANT CHANGE UP (ref 27–34)
MCHC RBC-ENTMCNC: 32.3 GM/DL — SIGNIFICANT CHANGE UP (ref 32–36)
MCV RBC AUTO: 92.7 FL — SIGNIFICANT CHANGE UP (ref 80–100)
MONOCYTES # BLD AUTO: 0.5 K/UL — SIGNIFICANT CHANGE UP (ref 0–0.9)
MONOCYTES NFR BLD AUTO: 8.7 % — SIGNIFICANT CHANGE UP (ref 2–14)
NEUTROPHILS # BLD AUTO: 4.44 K/UL — SIGNIFICANT CHANGE UP (ref 1.8–7.4)
NEUTROPHILS NFR BLD AUTO: 76.9 % — SIGNIFICANT CHANGE UP (ref 43–77)
PLATELET # BLD AUTO: 114 K/UL — LOW (ref 150–400)
POTASSIUM SERPL-MCNC: 3.9 MMOL/L — SIGNIFICANT CHANGE UP (ref 3.5–5.3)
POTASSIUM SERPL-SCNC: 3.9 MMOL/L — SIGNIFICANT CHANGE UP (ref 3.5–5.3)
PROT SERPL-MCNC: 6.3 G/DL — LOW (ref 6.6–8.7)
RBC # BLD: 3.84 M/UL — LOW (ref 4.2–5.8)
RBC # FLD: 14.6 % — HIGH (ref 10.3–14.5)
SODIUM SERPL-SCNC: 138 MMOL/L — SIGNIFICANT CHANGE UP (ref 135–145)
WBC # BLD: 5.78 K/UL — SIGNIFICANT CHANGE UP (ref 3.8–10.5)
WBC # FLD AUTO: 5.78 K/UL — SIGNIFICANT CHANGE UP (ref 3.8–10.5)

## 2024-09-27 PROCEDURE — C9602: CPT | Mod: LD

## 2024-09-27 PROCEDURE — C1887: CPT

## 2024-09-27 PROCEDURE — 85025 COMPLETE CBC W/AUTO DIFF WBC: CPT

## 2024-09-27 PROCEDURE — C1769: CPT

## 2024-09-27 PROCEDURE — 80053 COMPREHEN METABOLIC PANEL: CPT

## 2024-09-27 PROCEDURE — C1894: CPT

## 2024-09-27 PROCEDURE — C1724: CPT

## 2024-09-27 PROCEDURE — 36415 COLL VENOUS BLD VENIPUNCTURE: CPT

## 2024-09-27 PROCEDURE — 83735 ASSAY OF MAGNESIUM: CPT

## 2024-09-27 PROCEDURE — 93010 ELECTROCARDIOGRAM REPORT: CPT

## 2024-09-27 PROCEDURE — C1874: CPT

## 2024-09-27 PROCEDURE — C1760: CPT

## 2024-09-27 PROCEDURE — 93005 ELECTROCARDIOGRAM TRACING: CPT

## 2024-09-27 PROCEDURE — C1725: CPT

## 2024-09-27 RX ORDER — ASPIRIN 325 MG
1 TABLET ORAL
Refills: 0 | DISCHARGE

## 2024-09-27 RX ORDER — ATORVASTATIN CALCIUM 10 MG/1
1 TABLET, FILM COATED ORAL
Qty: 90 | Refills: 3
Start: 2024-09-27

## 2024-09-27 RX ORDER — ATORVASTATIN CALCIUM 10 MG/1
1 TABLET, FILM COATED ORAL
Refills: 0 | DISCHARGE

## 2024-09-27 RX ORDER — ASPIRIN 325 MG
1 TABLET ORAL
Qty: 90 | Refills: 3
Start: 2024-09-27

## 2024-09-27 RX ADMIN — Medication 100 MICROGRAM(S): at 05:25

## 2024-09-27 RX ADMIN — Medication 100 MILLIGRAM(S): at 05:25

## 2024-09-27 RX ADMIN — Medication 81 MILLIGRAM(S): at 08:56

## 2024-09-27 RX ADMIN — Medication 75 MILLIGRAM(S): at 08:56

## 2024-09-27 NOTE — PROGRESS NOTE ADULT - ASSESSMENT
78M former smoker PMH prostate CA s/p XRT, femur fracture, hand infection sp amputation, large renal cyst, valvular heart disease, dilated aorta, arthritis, hypothyroidism, HTN, HLD now presents after LHC with severe coronary artery calcification of the mid LAD >70% stenosis. 7/11/24 NST with apical defects, possible ischemia. CCTA 8/2024 with Calcium score of 2529 with >70% mLAD lesions + FFR suggestive of hemodynamically significant ischemia. LHC 9/24/24 via RRA which showed mLAD 90% heavily calcified lesion, LVEDP 14mmHg. Presents for rotational atherectomy and PCI of mid LAD with Dr. Tejeda on Thursday, 9/26/24.  Patient s/p LHC via RFA with Dr. Tejeda, on 09/27/24,  tolerated procedure well. RFA access site stable, no bleed/hematoma, distal pulse +,   Intraprocedural findings: severe mid LAD stenosis --> Rotational atherectomy, BRITTANY PCI x 1 ([place] 3.0x30mm)  Closure Device: RFA Angioseal      < from: Cardiac Catheterization (09.26.24 @ 11:35) >    Procedures Performed   Procedures:              1.    Ultrasound Guided Access     2.    Arterial Access - Right Femoral   3.    PCI: Rotational Atherectomy   4.   PCI: BRITTANY   5.    AngioSeal     Indications:               high risk ccta   abnormal stress test LAD ischemia intermediate risk     Conclusions:     successful pci of mid LAD with 1.5 mm rotational atherectomy and 1 BRITTANY  (3.0x30 post dilated 4.0 mm). This was in setting of  high risk CCTA (calcium score 2529 and CT-FFR 0.63) and abnormal  perfusion imaging.    **Known thoracic aorta dilation 4.6 cm. Case made fairly challenging.    Recommendations:     dapt for at least 6 months preferably 1 year. just increased  atorvastatin to 40. would benefit from cardiac rehab.    Acute complication:    No complications     < end of copied text >  Plan:  -Post procedure management/monitoring per protocol  -Access site precautions  -Labs and EKG in am stable, 12 lead EKG WITH SR, LVH,  NO ACUTE st/t changes   -NS 0.9% 250ml/hr x 1 bolus: post procedure CARINA ppx   -Continue current medical therapy  -Dual anti platelet therapy with aspirin/plavix   -Cont BB with Bisoprolol 10mg po daily   -Cont statin therapy with Lipitor 40mg po qHS , will consider increasing to 80mg pending lipid profile as outpatient with Dr. Tejeda  Will defer starting Zetia for hypertriglyceridemia for now, will follow up with OP cardiology DR Tejeda  -Educated regarding strict adherence with DAPT   -Educated regarding post procedure management and care  -Discussed the importance of RF modification  -Cardiac rehab info provided/referral and communication to cardiac rehab completed  -F/u 10/1/24 at 9:20am with Dr. Tejeda in [place] office  -F/U outpt in 1-2 weeks with Cardiologist Dr. Vega  -Patient stable for discharge to home, He verbalized understanding of discharge instructions

## 2024-09-27 NOTE — PROGRESS NOTE ADULT - SUBJECTIVE AND OBJECTIVE BOX
Department of Cardiology                                                                  [facility]/[place] [address] [address]                                                            Telephone: [phone]. Fax:[phone]                                                              Interventional CARDIOLOGY Progress note             :     HPI:  Narrative: 78M former smoker PMH prostate CA s/p XRT, femur fractural, hand infection sp amputation, large renal cyst, valvular heart disease, dilated aorta, arthritis, hypothyroidism, HTN, HLD now presents after [facility] with severe coronary artery calcification of the mid LAD >70% stenosis. 7/11/24 NST with apical defects, possible ischemia. CCTA 8/2024 with Calcium score of 2529 with >70% mLAD lesions + FFR suggestive of hemodynamically significant ischemia. LHC 9/24/24 via RRA which showed mLAD 90% heavily calcified lesion, LVEDP 14mmHg. Presents for rotational atherectomy and PCI of mid LAD with Dr. Tejeda on  9/26/24.    Review of Systems: negative unless mentioned in HPI    PHYSICAL EXAM:  Constitutional: A & O x 3, NAD  HEENT:  Normal oral mucosa, PERRL, EOMI	  Cardiovascular: S1 S2, no murmur, No JVD  Respiratory: Lungs clear to auscultation	  Gastrointestinal:  Soft, Non-tender, + BS	  Skin: No rashes or cyanosis  Neurologic: No deficit appreciated  Extremities: Normal range of motion, no edema  Vascular: distal pulses +       PAST MEDICAL & SURGICAL HISTORY:  Hypertension      Hypothyroidism      High cholesterol      Prostate cancer      History of hip replacement  left 2011      H/O total knee replacement, right  2010      S/P bilateral inguinal hernia repair          RADIOLOGY & ADDITIONAL STUDIES/DIAGNOSTIC TESTING:        CATHETERIZATION  FINDINGS:    < from: Cardiac Catheterization (09.26.24 @ 11:35) >  Cath Lab Report    InterventionalCardiologist:   Quique Tejeda MD   Referring Physician:           Silvestre Cedeño MD     Procedures Performed   Procedures:              1.    Ultrasound Guided Access     2.    Arterial Access - Right Femoral   3.    PCI: Rotational Atherectomy   4.   PCI: BRITTANY   5.    AngioSeal     Indications:               high risk ccta   abnormal stress test LAD ischemia intermediate risk     Conclusions:     successful pci of mid LAD with 1.5 mm rotational atherectomy and 1 BRITTANY  (3.0x30 post dilated 4.0 mm). This was in setting of  high risk CCTA (calcium score 2529 and CT-FFR 0.63) and abnormal  perfusion imaging.    **Known thoracic aorta dilation 4.6 cm. Case made fairly challenging.      Recommendations:     dapt for at least 6 months preferably 1 year. just increased  atorvastatin to 40. would benefit from cardiac rehab.    Acute complication:    No complications     < end of copied text >        Allergies    No Known Allergies    Intolerances        MEDICATIONS  (STANDING):  aspirin enteric coated 81 milliGRAM(s) Oral daily  atorvastatin 40 milliGRAM(s) Oral at bedtime  chlorhexidine 4% Liquid 1 Application(s) Topical once  clopidogrel Tablet 75 milliGRAM(s) Oral daily  levothyroxine 100 MICROGram(s) Oral daily  metoprolol tartrate 100 milliGRAM(s) Oral two times a day  tamsulosin 0.4 milliGRAM(s) Oral at bedtime    MEDICATIONS  (PRN):      FAMILY HISTORY:  FH: throat cancer  mother    FH: prostate cancer        SOCIAL HISTORY:    CIGARETTES:    ALCOHOL:    REVIEW OF SYSTEMS:  General: No fevers/chills, or fatigue  HEENT: No visual disturbances, no hearing loss, no headaches, no epistaxis.  CV: No chest pain,no palpitations, no edema, no orthopnea, no PND, or AVENDANO  Respiratory: No dyspnea, no wheeze, no cough.  GI: no nausea/vomiting, no black/bloody stools.  : No hematuria  Musculoskeletal: No myalgias, no arthralgias, no back pain.    Vital Signs Last 24 Hrs  T(C): 36.7 (27 Sep 2024 11:04), Max: 36.8 (26 Sep 2024 20:53)  T(F): 98 (27 Sep 2024 11:04), Max: 98.2 (26 Sep 2024 20:53)  HR: 60 (27 Sep 2024 11:04) (50 - 61)  BP: 112/71 (27 Sep 2024 11:04) (112/71 - 150/84)  BP(mean): 94 (27 Sep 2024 05:23) (94 - 101)  RR: 18 (27 Sep 2024 11:04) (14 - 19)  SpO2: 97% (27 Sep 2024 11:04) (95% - 100%)    Parameters below as of 27 Sep 2024 11:04  Patient On (Oxygen Delivery Method): room air        Daily     Daily     I&O's Detail    26 Sep 2024 07:01  -  27 Sep 2024 07:00  --------------------------------------------------------  IN:    Oral Fluid: 450 mL  Total IN: 450 mL    OUT:    Voided (mL): 740 mL  Total OUT: 740 mL    Total NET: -290 mL      27 Sep 2024 07:01  -  27 Sep 2024 11:05  --------------------------------------------------------  IN:  Total IN: 0 mL    OUT:    Voided (mL): 450 mL  Total OUT: 450 mL    Total NET: -450 mL          PHYSICAL EXAM:   GENERAL: Pt lying comfortably, NAD.  ENMT: PERRL, +EOMI.  NECK: soft, Supple, No JVD,   CHEST/LUNG: Clear to auscultatation bilaterally; No wheezing.  HEART: S1S2+, Regular rate and rhythm; No murmurs.  ABDOMEN: Soft, Nontender, Nondistended; Bowel sounds present.  MUSCULOSKELETAL: Normal range of motion.  SKIN: No rashes or lesions.  NEURO: AAOX3, no focal deficits, no motor r sensory loss.  PSYCH: normal mood.  VASCULAR:   Radial +2 R/+2 L  Femoral +2 R/+2 L  PT +2 R/+2 L  DP +2 R/+2 L      INTERPRETATION OF TELEMETRY:    ECG: SR, LVH, no acute ST/T changes     LABS:                        11.5   5.78  )-----------( 114      ( 27 Sep 2024 04:55 )             35.6     09-27    138  |  102  |  20.4[H]  ----------------------------<  109[H]  3.9   |  22.0  |  0.74    Ca    9.2      27 Sep 2024 04:55  Mg     1.9     09-27    TPro  6.3[L]  /  Alb  3.8  /  TBili  0.7  /  DBili  x   /  AST  32  /  ALT  19  /  AlkPhos  69  09-27          Urinalysis Basic - ( 27 Sep 2024 04:55 )    Color: x / Appearance: x / SG: x / pH: x  Gluc: 109 mg/dL / Ketone: x  / Bili: x / Urobili: x   Blood: x / Protein: x / Nitrite: x   Leuk Esterase: x / RBC: x / WBC x   Sq Epi: x / Non Sq Epi: x / Bacteria: x      I&O's Summary    26 Sep 2024 07:01  -  27 Sep 2024 07:00  --------------------------------------------------------  IN: 450 mL / OUT: 740 mL / NET: -290 mL    27 Sep 2024 07:01  -  27 Sep 2024 11:05  --------------------------------------------------------  IN: 0 mL / OUT: 450 mL / NET: -450 mL

## 2024-09-27 NOTE — DISCHARGE NOTE NURSING/CASE MANAGEMENT/SOCIAL WORK - PATIENT PORTAL LINK FT
You can access the FollowMyHealth Patient Portal offered by [facility] by registering at the following website: [email or web address]. By joining [employer]’s FollowMyHealth portal, you will also be able to view your health information using other applications (apps) compatible with our system.

## 2024-09-27 NOTE — DISCHARGE NOTE NURSING/CASE MANAGEMENT/SOCIAL WORK - NSDCPEFALRISK_GEN_ALL_CORE
For information on Fall & Injury Prevention, visit: [email or web address]  [email or web address] OR  https://www.cdc.gov/steadi/patient.html

## 2024-10-01 ENCOUNTER — APPOINTMENT (OUTPATIENT)
Dept: CARDIOLOGY | Facility: CLINIC | Age: 79
End: 2024-10-01
Payer: MEDICARE

## 2024-10-01 ENCOUNTER — NON-APPOINTMENT (OUTPATIENT)
Age: 79
End: 2024-10-01

## 2024-10-01 VITALS
WEIGHT: 210 LBS | BODY MASS INDEX: 31.01 KG/M2 | HEART RATE: 57 BPM | OXYGEN SATURATION: 94 % | SYSTOLIC BLOOD PRESSURE: 112 MMHG | DIASTOLIC BLOOD PRESSURE: 62 MMHG

## 2024-10-01 DIAGNOSIS — Z95.5 PRESENCE OF CORONARY ANGIOPLASTY IMPLANT AND GRAFT: ICD-10-CM

## 2024-10-01 DIAGNOSIS — I25.10 ATHEROSCLEROTIC HEART DISEASE OF NATIVE CORONARY ARTERY W/OUT ANGINA PECTORIS: ICD-10-CM

## 2024-10-01 PROBLEM — C61 MALIGNANT NEOPLASM OF PROSTATE: Chronic | Status: ACTIVE | Noted: 2024-09-26

## 2024-10-01 PROCEDURE — 93000 ELECTROCARDIOGRAM COMPLETE: CPT

## 2024-10-01 PROCEDURE — 99214 OFFICE O/P EST MOD 30 MIN: CPT

## 2024-10-01 RX ORDER — CLOPIDOGREL BISULFATE 75 MG/1
75 TABLET, FILM COATED ORAL
Qty: 90 | Refills: 3 | Status: ACTIVE | COMMUNITY
Start: 1900-01-01 | End: 1900-01-01

## 2024-10-01 NOTE — REASON FOR VISIT
[Symptom and Test Evaluation] : symptom and test evaluation [CV Risk Factors and Non-Cardiac Disease] : CV risk factors and non-cardiac disease [Coronary Artery Disease] : coronary artery disease [FreeTextEntry3] : Dr. Sam Perez

## 2024-10-01 NOTE — HISTORY OF PRESENT ILLNESS
[FreeTextEntry1] : 79 yo M here for follow up after PCI LAD. This was done in setting of high risk ccta and corresponding nuclear perfusion abnormality. I performed rotational atherectomy and 1 BRITTANY for heavily calcified lesion. access site well, feels fine. ekg unchanged.

## 2024-10-01 NOTE — DISCUSSION/SUMMARY
[FreeTextEntry1] : 79 yo M here for follow up after PCI LAD. This was done in setting of high risk ccta and corresponding nuclear perfusion abnormality. I performed rotational atherectomy and 1 BRITTANY for heavily calcified lesion.  - dapt for at least 6 months, statin I ordered repeat labs in 2 months - would benefit from cardiac rehab - rest as per Dr. Cedeño including surveillance of thoracic aorta dilation. Set him up in 2 months. ER precautions given to patient.

## 2024-10-01 NOTE — HISTORY OF PRESENT ILLNESS
[FreeTextEntry1] : 77 yo M here for follow up after PCI LAD. This was done in setting of high risk ccta and corresponding nuclear perfusion abnormality. I performed rotational atherectomy and 1 BRITTANY for heavily calcified lesion. access site well, feels fine. ekg unchanged.

## 2024-10-23 ENCOUNTER — APPOINTMENT (OUTPATIENT)
Dept: RADIATION ONCOLOGY | Facility: CLINIC | Age: 79
End: 2024-10-23
Payer: MEDICARE

## 2024-10-23 ENCOUNTER — NON-APPOINTMENT (OUTPATIENT)
Age: 79
End: 2024-10-23

## 2024-10-23 VITALS
TEMPERATURE: 97.4 F | WEIGHT: 206 LBS | RESPIRATION RATE: 15 BRPM | BODY MASS INDEX: 30.51 KG/M2 | HEART RATE: 56 BPM | HEIGHT: 69 IN | OXYGEN SATURATION: 100 % | SYSTOLIC BLOOD PRESSURE: 157 MMHG | DIASTOLIC BLOOD PRESSURE: 83 MMHG

## 2024-10-23 PROCEDURE — G2211 COMPLEX E/M VISIT ADD ON: CPT

## 2024-10-23 PROCEDURE — 99214 OFFICE O/P EST MOD 30 MIN: CPT

## 2024-10-24 ENCOUNTER — APPOINTMENT (OUTPATIENT)
Dept: UROLOGY | Facility: CLINIC | Age: 79
End: 2024-10-24
Payer: MEDICARE

## 2024-10-24 VITALS
SYSTOLIC BLOOD PRESSURE: 130 MMHG | HEART RATE: 59 BPM | WEIGHT: 206 LBS | DIASTOLIC BLOOD PRESSURE: 79 MMHG | TEMPERATURE: 97.3 F | BODY MASS INDEX: 30.51 KG/M2 | HEIGHT: 69 IN

## 2024-10-24 DIAGNOSIS — C61 MALIGNANT NEOPLASM OF PROSTATE: ICD-10-CM

## 2024-10-24 PROCEDURE — 99214 OFFICE O/P EST MOD 30 MIN: CPT

## 2024-12-03 ENCOUNTER — APPOINTMENT (OUTPATIENT)
Dept: CARDIOLOGY | Facility: CLINIC | Age: 79
End: 2024-12-03
Payer: MEDICARE

## 2024-12-03 VITALS
BODY MASS INDEX: 30.51 KG/M2 | WEIGHT: 206 LBS | SYSTOLIC BLOOD PRESSURE: 130 MMHG | HEIGHT: 69 IN | DIASTOLIC BLOOD PRESSURE: 70 MMHG | HEART RATE: 61 BPM | OXYGEN SATURATION: 95 %

## 2024-12-03 DIAGNOSIS — I35.1 NONRHEUMATIC AORTIC (VALVE) INSUFFICIENCY: ICD-10-CM

## 2024-12-03 DIAGNOSIS — I71.20 THORACIC AORTIC ANEURYSM, WITHOUT RUPTURE, UNSPECIFIED: ICD-10-CM

## 2024-12-03 DIAGNOSIS — E78.5 HYPERLIPIDEMIA, UNSPECIFIED: ICD-10-CM

## 2024-12-03 DIAGNOSIS — I25.10 ATHEROSCLEROTIC HEART DISEASE OF NATIVE CORONARY ARTERY W/OUT ANGINA PECTORIS: ICD-10-CM

## 2024-12-03 DIAGNOSIS — Z95.5 PRESENCE OF CORONARY ANGIOPLASTY IMPLANT AND GRAFT: ICD-10-CM

## 2024-12-03 DIAGNOSIS — I10 ESSENTIAL (PRIMARY) HYPERTENSION: ICD-10-CM

## 2024-12-03 PROCEDURE — 93306 TTE W/DOPPLER COMPLETE: CPT

## 2024-12-03 PROCEDURE — 99214 OFFICE O/P EST MOD 30 MIN: CPT

## 2024-12-04 DIAGNOSIS — N28.89 OTHER SPECIFIED DISORDERS OF KIDNEY AND URETER: ICD-10-CM

## 2025-01-02 ENCOUNTER — NON-APPOINTMENT (OUTPATIENT)
Age: 80
End: 2025-01-02

## 2025-01-06 ENCOUNTER — NON-APPOINTMENT (OUTPATIENT)
Age: 80
End: 2025-01-06

## 2025-01-06 ENCOUNTER — APPOINTMENT (OUTPATIENT)
Dept: OPHTHALMOLOGY | Facility: CLINIC | Age: 80
End: 2025-01-06
Payer: MEDICARE

## 2025-01-06 PROCEDURE — 92014 COMPRE OPH EXAM EST PT 1/>: CPT

## 2025-01-06 PROCEDURE — 92134 CPTRZ OPH DX IMG PST SGM RTA: CPT

## 2025-02-20 ENCOUNTER — LABORATORY RESULT (OUTPATIENT)
Age: 80
End: 2025-02-20

## 2025-03-04 ENCOUNTER — APPOINTMENT (OUTPATIENT)
Dept: CARDIOLOGY | Facility: CLINIC | Age: 80
End: 2025-03-04
Payer: MEDICARE

## 2025-03-04 VITALS
HEART RATE: 56 BPM | HEIGHT: 69 IN | OXYGEN SATURATION: 98 % | SYSTOLIC BLOOD PRESSURE: 112 MMHG | BODY MASS INDEX: 30.36 KG/M2 | WEIGHT: 205 LBS | DIASTOLIC BLOOD PRESSURE: 78 MMHG

## 2025-03-04 DIAGNOSIS — I10 ESSENTIAL (PRIMARY) HYPERTENSION: ICD-10-CM

## 2025-03-04 DIAGNOSIS — I35.1 NONRHEUMATIC AORTIC (VALVE) INSUFFICIENCY: ICD-10-CM

## 2025-03-04 DIAGNOSIS — Z95.5 PRESENCE OF CORONARY ANGIOPLASTY IMPLANT AND GRAFT: ICD-10-CM

## 2025-03-04 DIAGNOSIS — I71.20 THORACIC AORTIC ANEURYSM, WITHOUT RUPTURE, UNSPECIFIED: ICD-10-CM

## 2025-03-04 DIAGNOSIS — I25.10 ATHEROSCLEROTIC HEART DISEASE OF NATIVE CORONARY ARTERY W/OUT ANGINA PECTORIS: ICD-10-CM

## 2025-03-04 DIAGNOSIS — E78.5 HYPERLIPIDEMIA, UNSPECIFIED: ICD-10-CM

## 2025-03-04 PROCEDURE — 99214 OFFICE O/P EST MOD 30 MIN: CPT

## 2025-04-11 ENCOUNTER — NON-APPOINTMENT (OUTPATIENT)
Age: 80
End: 2025-04-11

## 2025-04-11 ENCOUNTER — APPOINTMENT (OUTPATIENT)
Dept: RADIATION ONCOLOGY | Facility: CLINIC | Age: 80
End: 2025-04-11
Payer: MEDICARE

## 2025-04-11 VITALS
OXYGEN SATURATION: 92 % | TEMPERATURE: 97.7 F | SYSTOLIC BLOOD PRESSURE: 131 MMHG | WEIGHT: 207 LBS | BODY MASS INDEX: 30.66 KG/M2 | HEIGHT: 69 IN | HEART RATE: 57 BPM | DIASTOLIC BLOOD PRESSURE: 75 MMHG | RESPIRATION RATE: 15 BRPM

## 2025-04-11 PROCEDURE — 99214 OFFICE O/P EST MOD 30 MIN: CPT

## 2025-04-11 PROCEDURE — G2211 COMPLEX E/M VISIT ADD ON: CPT

## 2025-04-14 ENCOUNTER — NON-APPOINTMENT (OUTPATIENT)
Age: 80
End: 2025-04-14

## 2025-04-16 ENCOUNTER — APPOINTMENT (OUTPATIENT)
Dept: UROLOGY | Facility: CLINIC | Age: 80
End: 2025-04-16
Payer: MEDICARE

## 2025-04-16 VITALS
WEIGHT: 207 LBS | HEART RATE: 55 BPM | TEMPERATURE: 97.3 F | DIASTOLIC BLOOD PRESSURE: 76 MMHG | HEIGHT: 69 IN | BODY MASS INDEX: 30.66 KG/M2 | SYSTOLIC BLOOD PRESSURE: 136 MMHG

## 2025-04-16 DIAGNOSIS — N28.89 OTHER SPECIFIED DISORDERS OF KIDNEY AND URETER: ICD-10-CM

## 2025-04-16 DIAGNOSIS — C61 MALIGNANT NEOPLASM OF PROSTATE: ICD-10-CM

## 2025-04-16 PROCEDURE — 99214 OFFICE O/P EST MOD 30 MIN: CPT

## 2025-04-16 PROCEDURE — G2211 COMPLEX E/M VISIT ADD ON: CPT

## 2025-07-17 ENCOUNTER — APPOINTMENT (OUTPATIENT)
Dept: MRI IMAGING | Facility: CLINIC | Age: 80
End: 2025-07-17

## 2025-07-17 PROCEDURE — 74183 MRI ABD W/O CNTR FLWD CNTR: CPT | Mod: TC

## 2025-07-17 PROCEDURE — A9585: CPT

## 2025-08-11 ENCOUNTER — APPOINTMENT (OUTPATIENT)
Dept: UROLOGY | Facility: CLINIC | Age: 80
End: 2025-08-11
Payer: MEDICARE

## 2025-08-11 VITALS
TEMPERATURE: 97.3 F | WEIGHT: 207 LBS | SYSTOLIC BLOOD PRESSURE: 119 MMHG | DIASTOLIC BLOOD PRESSURE: 68 MMHG | BODY MASS INDEX: 30.66 KG/M2 | HEART RATE: 56 BPM | HEIGHT: 69 IN

## 2025-08-11 DIAGNOSIS — N28.89 OTHER SPECIFIED DISORDERS OF KIDNEY AND URETER: ICD-10-CM

## 2025-08-11 DIAGNOSIS — C61 MALIGNANT NEOPLASM OF PROSTATE: ICD-10-CM

## 2025-08-11 PROCEDURE — 99214 OFFICE O/P EST MOD 30 MIN: CPT

## 2025-09-02 LAB — HBA1C MFR BLD HPLC: 6.5

## 2025-09-08 ENCOUNTER — APPOINTMENT (OUTPATIENT)
Dept: CARDIOLOGY | Facility: CLINIC | Age: 80
End: 2025-09-08
Payer: MEDICARE

## 2025-09-08 VITALS
WEIGHT: 203 LBS | BODY MASS INDEX: 30.07 KG/M2 | HEART RATE: 63 BPM | OXYGEN SATURATION: 95 % | HEIGHT: 69 IN | SYSTOLIC BLOOD PRESSURE: 130 MMHG | DIASTOLIC BLOOD PRESSURE: 68 MMHG

## 2025-09-08 DIAGNOSIS — I25.10 ATHEROSCLEROTIC HEART DISEASE OF NATIVE CORONARY ARTERY W/OUT ANGINA PECTORIS: ICD-10-CM

## 2025-09-08 DIAGNOSIS — E78.5 HYPERLIPIDEMIA, UNSPECIFIED: ICD-10-CM

## 2025-09-08 DIAGNOSIS — I71.20 THORACIC AORTIC ANEURYSM, WITHOUT RUPTURE, UNSPECIFIED: ICD-10-CM

## 2025-09-08 DIAGNOSIS — I35.1 NONRHEUMATIC AORTIC (VALVE) INSUFFICIENCY: ICD-10-CM

## 2025-09-08 DIAGNOSIS — I10 ESSENTIAL (PRIMARY) HYPERTENSION: ICD-10-CM

## 2025-09-08 DIAGNOSIS — Z95.5 PRESENCE OF CORONARY ANGIOPLASTY IMPLANT AND GRAFT: ICD-10-CM

## 2025-09-08 PROCEDURE — 99214 OFFICE O/P EST MOD 30 MIN: CPT
